# Patient Record
Sex: FEMALE | Race: WHITE | NOT HISPANIC OR LATINO | Employment: FULL TIME | URBAN - METROPOLITAN AREA
[De-identification: names, ages, dates, MRNs, and addresses within clinical notes are randomized per-mention and may not be internally consistent; named-entity substitution may affect disease eponyms.]

---

## 2017-01-17 ENCOUNTER — OFFICE VISIT (OUTPATIENT)
Dept: URGENT CARE | Facility: PHYSICIAN GROUP | Age: 52
End: 2017-01-17
Payer: COMMERCIAL

## 2017-01-17 VITALS
WEIGHT: 213 LBS | DIASTOLIC BLOOD PRESSURE: 60 MMHG | HEIGHT: 66 IN | BODY MASS INDEX: 34.23 KG/M2 | RESPIRATION RATE: 20 BRPM | SYSTOLIC BLOOD PRESSURE: 98 MMHG | OXYGEN SATURATION: 98 % | TEMPERATURE: 97.9 F | HEART RATE: 81 BPM

## 2017-01-17 DIAGNOSIS — J45.901 ASTHMA EXACERBATION: ICD-10-CM

## 2017-01-17 DIAGNOSIS — J01.40 ACUTE NON-RECURRENT PANSINUSITIS: ICD-10-CM

## 2017-01-17 DIAGNOSIS — H92.03 OTALGIA OF BOTH EARS: ICD-10-CM

## 2017-01-17 DIAGNOSIS — H69.93 ETD (EUSTACHIAN TUBE DYSFUNCTION), BILATERAL: ICD-10-CM

## 2017-01-17 PROCEDURE — 99214 OFFICE O/P EST MOD 30 MIN: CPT | Performed by: PHYSICIAN ASSISTANT

## 2017-01-17 RX ORDER — ALBUTEROL SULFATE 90 UG/1
2 AEROSOL, METERED RESPIRATORY (INHALATION) EVERY 6 HOURS PRN
Qty: 8.5 G | Refills: 0 | Status: SHIPPED | OUTPATIENT
Start: 2017-01-17 | End: 2023-07-16

## 2017-01-17 RX ORDER — AMOXICILLIN AND CLAVULANATE POTASSIUM 875; 125 MG/1; MG/1
1 TABLET, FILM COATED ORAL 2 TIMES DAILY
Qty: 20 TAB | Refills: 0 | Status: SHIPPED | OUTPATIENT
Start: 2017-01-17 | End: 2017-03-06 | Stop reason: SDUPTHER

## 2017-01-17 ASSESSMENT — ENCOUNTER SYMPTOMS
ABDOMINAL PAIN: 0
SHORTNESS OF BREATH: 0
MYALGIAS: 1
DIARRHEA: 0
HEADACHES: 1
DIZZINESS: 0
FEVER: 0
NAUSEA: 0
CHILLS: 0
SORE THROAT: 1
COUGH: 1

## 2017-01-17 NOTE — MR AVS SNAPSHOT
"        Keiko Burgos   2017 4:45 PM   Office Visit   MRN: [id number]    Department:  [facility]   Dept Phone:  [phone]    Description:  Female : 1965   Provider:  Roselia Wu PA-C           Reason for Visit     Otalgia Rt ear pain, productive cough, sinus pressure and pain, loss of voice x 6 days.       Allergies as of 2017     Allergen Noted Reactions    Influenza Vaccines 2016   Unspecified    Hx of Guillian Orlando    Keflex 2009   Unspecified    Hx of Guillian [place]    Other Drug 2016   Unspecified    Pneumonia vaccine. Hx of Guillian [place]    Rocephin [Ceftriaxone Sodium-Lidocaine] 2015   Unspecified    PT WILL NOT TAKE MEDICATION. NOT ALLERGIC.    Sulfa Drugs 2009   Unspecified    Hx of Guillian [place]    Tape 2016   Unspecified    Blister. Paper tape ok per patient    Tetanus Toxoids 2016   Unspecified    Hx of Guillian [place]      You were diagnosed with     Asthma exacerbation   [805959]       Acute non-recurrent pansinusitis   [1643266]       ETD (eustachian tube dysfunction), bilateral   [5396657]       Otalgia of both ears   [[id number]]         Vital Signs     Blood Pressure Pulse Temperature Respirations Height Weight    98/60 mmHg 81 36.6 °C (97.9 °F) 20 1.676 m (5' 6\") 96.616 kg (213 lb)    Body Mass Index Oxygen Saturation Smoking Status             34.40 kg/m2 98% Former Smoker         Basic Information     Date Of Birth Sex Race Ethnicity Preferred Language    1965 Female White Non- English      Problem List              ICD-10-CM Priority Class Noted - Resolved    Depression F32.9   2009 - Present    Chronic fatigue R53.82   2009 - Present    Family discord Z63.9   2014 - Present    Mood disorder (CMS-HCC) F39   2014 - Present    Insomnia G47.00   2014 - Present    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   2015 - Present    Mammographic microcalcification found on " diagnostic imaging of breast R92.0   4/9/2015 - Present    Depression with anxiety (Chronic) F41.8   2/16/2016 - Present    Lumbar radiculopathy M54.16   8/5/2016 - Present    Lumbar postlaminectomy syndrome M96.1   8/5/2016 - Present    Chronic low back pain (Chronic) M54.5, G89.29   8/5/2016 - Present    Osteoarthritis of spine with radiculopathy, lumbosacral region M47.27   8/5/2016 - Present    Orthostatic hypotension I95.1 High  11/8/2016 - Present      Health Maintenance        Date Due Completion Dates    MAMMOGRAM 12/17/2017 12/17/2016, 11/24/2015, 4/14/2015, 11/19/2014, 5/2/2014, 10/30/2013, 10/23/2013    IMM DTaP/Tdap/Td Vaccine (2 - Td) 4/9/2025 4/9/2015            Current Immunizations     Tdap Vaccine 4/9/2015      Below and/or attached are the medications your provider expects you to take. Review all of your home medications and newly ordered medications with your provider and/or pharmacist. Follow medication instructions as directed by your provider and/or pharmacist. Please keep your medication list with you and share with your provider. Update the information when medications are discontinued, doses are changed, or new medications (including over-the-counter products) are added; and carry medication information at all times in the event of emergency situations     Allergies:  INFLUENZA VACCINES - Unspecified     KEFLEX - Unspecified     OTHER DRUG - Unspecified     ROCEPHIN - Unspecified     SULFA DRUGS - Unspecified     TAPE - Unspecified     TETANUS TOXOIDS - Unspecified               Medications  Valid as of: January 17, 2017 -  5:20 PM    Generic Name Brand Name Tablet Size Instructions for use    Albuterol Sulfate (Aero Soln) albuterol 108 (90 BASE) MCG/ACT Inhale 2 Puffs by mouth every 6 hours as needed for Shortness of Breath.        Amoxicillin-Pot Clavulanate (Tab) AUGMENTIN 875-125 MG Take 1 Tab by mouth 2 times a day.        B Complex-C (Tab) B-complex with vitamin C  Take 1 Tab by mouth  every evening.        Citalopram Hydrobromide (Tab) CELEXA 20 MG Take 20 mg by mouth every evening.        Coenzyme Q10   Take 1 Cap by mouth every evening.        Dronabinol (Cap) MARINOL 5 MG Take 5 mg by mouth every bedtime.        Fludrocortisone Acetate (Tab) FLORINEF 0.1 MG Take 1 Tab by mouth every morning.        GuaiFENesin   Take  by mouth.        Hydrocortisone (Tab) CORTEF 10 MG Take 1 Tab by mouth 2 times a day.        Ibuprofen (Tab) MOTRIN 800 MG Take 800 mg by mouth every 8 hours as needed for Moderate Pain.        Nabumetone (Tab) RELAFEN 500 MG Take 500 mg by mouth 2 times a day as needed for Moderate Pain.        Phenylephrine-Acetaminophen   Take  by mouth.        .                 Medicines prescribed today were sent to:     [facility] [address] [place] [address], [place] [address] [facility] [place] [address] [address] [place] [address]    Phone: [phone] Fax: [phone]    Open 24 Hours?: No      Medication refill instructions:       If your prescription bottle indicates you have medication refills left, it is not necessary to call your provider’s office. Please contact your pharmacy and they will refill your medication.    If your prescription bottle indicates you do not have any refills left, you may request refills at any time through one of the following ways: The online [employer] system (except Urgent Care), by calling your provider’s office, or by asking your pharmacy to contact your provider’s office with a refill request. Medication refills are processed only during regular business hours and may not be available until the next business day. Your provider may request additional information or to have a follow-up visit with you prior to refilling your medication.   *Please Note: Medication refills are assigned a new Rx number when refilled electronically. Your pharmacy may indicate that no refills were authorized even though a new prescription for the same medication is  available at the pharmacy. Please request the medicine by name with the pharmacy before contacting your provider for a refill.        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

## 2017-01-18 NOTE — PROGRESS NOTES
"Subjective:      Keiko Burgos is a 51 y.o. female who presents with Otalgia    Current medications reviewed.  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  Sinus headaches and tight chest with sob, fevers in first days of illness only.      Otalgia   Associated symptoms include coughing, headaches and a sore throat. Pertinent negatives include no abdominal pain, diarrhea or rash.       Review of Systems   Constitutional: Positive for malaise/fatigue. Negative for fever and chills.   HENT: Positive for congestion, ear pain and sore throat.    Respiratory: Positive for cough. Negative for shortness of breath.    Cardiovascular: Negative for chest pain.   Gastrointestinal: Negative for nausea, abdominal pain and diarrhea.   Musculoskeletal: Positive for myalgias. Negative for joint pain.   Skin: Negative for rash.   Neurological: Positive for headaches. Negative for dizziness.   All other systems reviewed and are negative.         Objective:     BP 98/60 mmHg  Pulse 81  Temp(Src) 36.6 °C (97.9 °F)  Resp 20  Ht 1.676 m (5' 6\")  Wt 96.616 kg (213 lb)  BMI 34.40 kg/m2  SpO2 98%     Physical Exam   Constitutional: She appears well-developed and well-nourished. No distress.   HENT:   Right Ear: Tympanic membrane and ear canal normal.   Left Ear: Tympanic membrane and ear canal " normal.   Nose: Mucosal edema present. Right sinus exhibits maxillary sinus tenderness. Right sinus exhibits no frontal sinus tenderness. Left sinus exhibits maxillary sinus tenderness. Left sinus exhibits no frontal sinus tenderness.   Mouth/Throat: Posterior oropharyngeal edema (mod) and posterior oropharyngeal erythema (mod) present. No oropharyngeal exudate.   Cardiovascular: Normal rate and regular rhythm.    Pulmonary/Chest: Effort normal. No respiratory distress. She has decreased breath sounds (mod) in the right upper field, the right middle field, the right lower field, the left upper field, the left middle field and the left lower field. She has no wheezes. She has no rales.   Lymphadenopathy:     She has no cervical adenopathy.   Skin: Skin is warm and dry.               Assessment/Plan:     1. Asthma exacerbation  albuterol 108 (90 BASE) MCG/ACT Aero Soln inhalation aerosol   2. Acute non-recurrent pansinusitis  amoxicillin-clavulanate (AUGMENTIN) 875-125 MG Tab   3. ETD (eustachian tube dysfunction), bilateral     4. Otalgia of both ears       Take all abx, push fluids rest.  Use inhaler regularly, OTC meds discussed to control symptoms.  Follow up with pcp in 2 weeks with further concerns. Patient reports understanding and agrees with plan.

## 2017-03-06 ENCOUNTER — OFFICE VISIT (OUTPATIENT)
Dept: MEDICAL GROUP | Facility: CLINIC | Age: 52
End: 2017-03-06
Payer: COMMERCIAL

## 2017-03-06 VITALS
HEIGHT: 66 IN | OXYGEN SATURATION: 97 % | WEIGHT: 217 LBS | HEART RATE: 80 BPM | TEMPERATURE: 98.5 F | DIASTOLIC BLOOD PRESSURE: 56 MMHG | SYSTOLIC BLOOD PRESSURE: 98 MMHG | RESPIRATION RATE: 16 BRPM | BODY MASS INDEX: 34.87 KG/M2

## 2017-03-06 DIAGNOSIS — H92.01 OTALGIA, RIGHT EAR: ICD-10-CM

## 2017-03-06 DIAGNOSIS — H66.91 OTITIS, RIGHT: ICD-10-CM

## 2017-03-06 PROCEDURE — 99213 OFFICE O/P EST LOW 20 MIN: CPT | Performed by: NURSE PRACTITIONER

## 2017-03-06 RX ORDER — AMOXICILLIN AND CLAVULANATE POTASSIUM 875; 125 MG/1; MG/1
1 TABLET, FILM COATED ORAL 2 TIMES DAILY
Qty: 20 TAB | Refills: 0 | Status: SHIPPED | OUTPATIENT
Start: 2017-03-06 | End: 2017-07-07 | Stop reason: SDUPTHER

## 2017-03-06 ASSESSMENT — ENCOUNTER SYMPTOMS
VOMITING: 0
DOUBLE VISION: 0
COUGH: 1
DIZZINESS: 0
FEVER: 0
SHORTNESS OF BREATH: 0
SORE THROAT: 1
CHILLS: 0
PHOTOPHOBIA: 0
HEADACHES: 1
NAUSEA: 1
SPUTUM PRODUCTION: 0
BLURRED VISION: 0
WHEEZING: 0
MYALGIAS: 1
EYE PAIN: 0

## 2017-03-06 NOTE — MR AVS SNAPSHOT
"        Keiko Rodrigues Aubrey   3/6/2017 4:00 PM   Office Visit   MRN: [id number]    Department:  [facility]   Dept Phone:  [phone]    Description:  Female : 1965   Provider:  AKIRA Schreiber           Reason for Visit     Otalgia Ears plugging/ sore thorat/ horseness/       Allergies as of 3/6/2017     Allergen Noted Reactions    Influenza Vaccines 2016   Unspecified    Hx of Guillian [place]    Keflex 2009   Unspecified    Hx of Guillian [place]    Other Drug 2016   Unspecified    Pneumonia vaccine. Hx of Guillian [place]    Rocephin [Ceftriaxone Sodium-Lidocaine] 2015   Unspecified    PT WILL NOT TAKE MEDICATION. NOT ALLERGIC.    Sulfa Drugs 2009   Unspecified    Hx of Guillian [place]    Tape 2016   Unspecified    Blister. Paper tape ok per patient    Tetanus Toxoids 2016   Unspecified    Hx of Guillian [place]      You were diagnosed with     Otitis, right   [5349292]       Otalgia, right ear   [[id number]]         Vital Signs     Blood Pressure Pulse Temperature Respirations Height Weight    98/56 mmHg 80 36.9 °C (98.5 °F) 16 1.676 m (5' 6\") 98.431 kg (217 lb)    Body Mass Index Oxygen Saturation Smoking Status             35.04 kg/m2 97% Former Smoker         Basic Information     Date Of Birth Sex Race Ethnicity Preferred Language    1965 Female White Non- English      Your appointments     2017  4:00 PM   Annual Exam with Marcie Antonio M.D.   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   2015 - " Present    Mammographic microcalcification found on diagnostic imaging of breast R92.0   4/9/2015 - Present    Depression with anxiety (Chronic) F41.8   2/16/2016 - Present    Lumbar radiculopathy M54.16   8/5/2016 - Present    Lumbar postlaminectomy syndrome M96.1   8/5/2016 - Present    Chronic low back pain (Chronic) M54.5, G89.29   8/5/2016 - Present    Osteoarthritis of spine with radiculopathy, lumbosacral region M47.27   8/5/2016 - Present    Orthostatic hypotension I95.1 High  11/8/2016 - Present      Health Maintenance        Date Due Completion Dates    MAMMOGRAM 12/17/2017 12/17/2016, 11/24/2015, 4/14/2015, 11/19/2014, 5/2/2014, 10/30/2013, 10/23/2013    IMM DTaP/Tdap/Td Vaccine (2 - Td) 4/9/2025 4/9/2015            Current Immunizations     Tdap Vaccine 4/9/2015      Below and/or attached are the medications your provider expects you to take. Review all of your home medications and newly ordered medications with your provider and/or pharmacist. Follow medication instructions as directed by your provider and/or pharmacist. Please keep your medication list with you and share with your provider. Update the information when medications are discontinued, doses are changed, or new medications (including over-the-counter products) are added; and carry medication information at all times in the event of emergency situations     Allergies:  INFLUENZA VACCINES - Unspecified     KEFLEX - Unspecified     OTHER DRUG - Unspecified     ROCEPHIN - Unspecified     SULFA DRUGS - Unspecified     TAPE - Unspecified     TETANUS TOXOIDS - Unspecified               Medications  Valid as of: March 06, 2017 -  5:17 PM    Generic Name Brand Name Tablet Size Instructions for use    Albuterol Sulfate (Aero Soln) albuterol 108 (90 BASE) MCG/ACT Inhale 2 Puffs by mouth every 6 hours as needed for Shortness of Breath.        Amoxicillin-Pot Clavulanate (Tab) AUGMENTIN 875-125 MG Take 1 Tab by mouth 2 times a day.        B Complex-C  (Tab) B-complex with vitamin C  Take 1 Tab by mouth every evening.        Citalopram Hydrobromide (Tab) CELEXA 20 MG Take 20 mg by mouth every evening.        Coenzyme Q10   Take 1 Cap by mouth every evening.        Dronabinol (Cap) MARINOL 5 MG Take 5 mg by mouth every bedtime.        Fludrocortisone Acetate (Tab) FLORINEF 0.1 MG Take 1 Tab by mouth every morning.        GuaiFENesin   Take  by mouth.        Hydrocortisone (Tab) CORTEF 10 MG Take 1 Tab by mouth 2 times a day.        Ibuprofen (Tab) MOTRIN 800 MG Take 800 mg by mouth every 8 hours as needed for Moderate Pain.        Nabumetone (Tab) RELAFEN 500 MG Take 500 mg by mouth 2 times a day as needed for Moderate Pain.        Phenylephrine-Acetaminophen   Take  by mouth.        .                 Medicines prescribed today were sent to:     [facility] [address] [place] [address], [place] [address] [facility] [facility] [employer]    [address] [place] [address]    Phone: [phone] Fax: [phone]    Open 24 Hours?: No      Medication refill instructions:       If your prescription bottle indicates you have medication refills left, it is not necessary to call your provider’s office. Please contact your pharmacy and they will refill your medication.    If your prescription bottle indicates you do not have any refills left, you may request refills at any time through one of the following ways: The online [employer] system (except Urgent Care), by calling your provider’s office, or by asking your pharmacy to contact your provider’s office with a refill request. Medication refills are processed only during regular business hours and may not be available until the next business day. Your provider may request additional information or to have a follow-up visit with you prior to refilling your medication.   *Please Note: Medication refills are assigned a new Rx number when refilled electronically. Your pharmacy may indicate that no refills were authorized even  though a new prescription for the same medication is available at the pharmacy. Please request the medicine by name with the pharmacy before contacting your provider for a refill.        Referral     A referral request has been sent to our patient care coordination department. Please allow 3-5 business days for us to process this request and contact you either by phone or mail. If you do not hear from us by the 5th business day, please call us at [phone].        Other Notes About Your Plan     History Guillain-[place]inck[place] syndrome - no vaccines           MyChart Access Code: Activation code not generated  Current MyChart Status: Active

## 2017-03-07 NOTE — PROGRESS NOTES
Chief Complaint   Patient presents with   • Otalgia     Ears plugging/ sore thorat/ horseness/        HISTORY OF PRESENT ILLNESS: Patient is a 51 y.o. female established patient who presents today due to a week hx of ear plugging, sore throat, hoarseness, coughing. Pt reported that he gets recurrent symptoms about 6 times in the past 12 months. She is not taking any OTC medication for her symptoms. She reported that her otalgia is usually at right side more than left side and in the past year, she does feel a little bit hearing impairment to her right ear. She reported that she does not have any tinitus and she can hear and understand what people taking about but it just has that weird feeling the voice is filmed out or the conduction is just different. She denied fever or chills. No dizziness or vertigo. She took augmentin before for laryngitis, pharyngitis, otitis or sinusitis every time when she was seen for these symptoms and so far the augmentin seems to work very well but she just keep having it back every 2-3 months.       Patient Active Problem List    Diagnosis Date Noted   • Orthostatic hypotension 11/08/2016     Priority: High   • Lumbar radiculopathy 08/05/2016   • Lumbar postlaminectomy syndrome 08/05/2016   • Chronic low back pain 08/05/2016   • Osteoarthritis of spine with radiculopathy, lumbosacral region 08/05/2016   • Depression with anxiety 02/16/2016   • History of Guillain-[place] syndrome 04/09/2015   • Mammographic microcalcification found on diagnostic imaging of breast 04/09/2015   • Family discord 04/17/2014   • Mood disorder (CMS-[facility]) 04/17/2014   • Insomnia 04/17/2014   • Depression 09/23/2009   • Chronic fatigue 09/23/2009       Allergies:Influenza vaccines; Keflex; Other drug; Rocephin; Sulfa drugs; Tape; and Tetanus toxoids    Current Outpatient Prescriptions   Medication Sig Dispense Refill   • GuaiFENesin (MUCINEX PO) Take  by mouth.     • Phenylephrine-Acetaminophen (SUDAFED PE  PRESSURE + PAIN PO) Take  by mouth.     • albuterol 108 (90 BASE) MCG/ACT Aero Soln inhalation aerosol Inhale 2 Puffs by mouth every 6 hours as needed for Shortness of Breath. 8.5 g 0   • hydrocortisone (CORTEF) 10 MG Tab Take 1 Tab by mouth 2 times a day. 60 Tab 5   • fludrocortisone (FLORINEF) 0.1 MG Tab Take 1 Tab by mouth every morning. 30 Tab 0   • B Complex-C (B-COMPLEX WITH VITAMIN C) tablet Take 1 Tab by mouth every evening.     • Coenzyme Q10 (CO Q 10 PO) Take 1 Cap by mouth every evening.     • citalopram (CELEXA) 20 MG Tab Take 20 mg by mouth every evening.     • ibuprofen (MOTRIN) 800 MG Tab Take 800 mg by mouth every 8 hours as needed for Moderate Pain.     • nabumetone (RELAFEN) 500 MG Tab Take 500 mg by mouth 2 times a day as needed for Moderate Pain.     • dronabinol (MARINOL) 5 MG Cap Take 5 mg by mouth every bedtime.     • amoxicillin-clavulanate (AUGMENTIN) 875-125 MG Tab Take 1 Tab by mouth 2 times a day. 20 Tab 0     No current facility-administered medications for this visit.       Social History   Substance Use Topics   • Smoking status: Former Smoker     Quit date: 11/19/1989   • Smokeless tobacco: Never Used      Comment: avoid all tobacco products   • Alcohol Use: 0.0 oz/week     0 Standard drinks or equivalent per week      Comment: rarely       Family Status   Relation Status Death Age   • Mother Alive    • Father Alive      dementia   • Maternal Grandmother Alive    • Paternal Grandmother Alive      Family History   Problem Relation Age of Onset   • Heart Disease Mother    • Heart Disease Father    • Cancer Maternal Grandmother      colon    • Cancer Paternal Grandmother      ovarian         ROS:  Review of Systems   Constitutional: Negative for fever and chills.   HENT: Positive for ear pain and sore throat. Negative for congestion, hearing loss and tinnitus ( no hearing loss but feels a little bit imparied to right side).    Eyes: Negative for blurred vision, double vision, photophobia  "and pain.   Respiratory: Positive for cough. Negative for sputum production, shortness of breath and wheezing.    Gastrointestinal: Positive for nausea. Negative for vomiting.   Musculoskeletal: Positive for myalgias.   Neurological: Positive for headaches. Negative for dizziness.        Objective:     Blood pressure 98/56, pulse 80, temperature 36.9 °C (98.5 °F), resp. rate 16, height 1.676 m (5' 6\"), weight 98.431 kg (217 lb), SpO2 97 %.     Physical Exam:  Physical Exam   Constitutional: She is oriented to person, place, and time and well-developed, well-nourished, and in no distress.   HENT:   Head: Normocephalic and atraumatic.   Right Ear: External ear normal. Tympanic membrane is injected. A middle ear effusion is present.   Left Ear: Hearing, tympanic membrane and external ear normal.   Nose: Right sinus exhibits no maxillary sinus tenderness and no frontal sinus tenderness. Left sinus exhibits no maxillary sinus tenderness and no frontal sinus tenderness.   Mouth/Throat: No oropharyngeal exudate, posterior oropharyngeal edema, posterior oropharyngeal erythema or tonsillar abscesses.   Eyes: Conjunctivae are normal.   Neck: Neck supple. No JVD present.   Cardiovascular: Normal rate.    Pulmonary/Chest: Effort normal. No respiratory distress. She has no wheezes. She has no rales.   Musculoskeletal: Normal range of motion.   Neurological: She is alert and oriented to person, place, and time.   Skin: Skin is warm. No erythema.   Vitals reviewed.        Assessment/Plan:  1. Otitis, right  - amoxicillin-clavulanate (AUGMENTIN) 875-125 MG Tab; Take 1 Tab by mouth 2 times a day.  Dispense: 20 Tab; Refill: 0  - REFERRAL TO ENT    2. Otalgia, right ear, recurrent 6 times in 12 months   - REFERRAL TO ENT         "

## 2017-04-20 ENCOUNTER — HOSPITAL ENCOUNTER (OUTPATIENT)
Dept: LAB | Facility: MEDICAL CENTER | Age: 52
End: 2017-04-20
Attending: INTERNAL MEDICINE
Payer: COMMERCIAL

## 2017-04-20 LAB
ESTRADIOL SERPL-MCNC: 123 PG/ML
FSH SERPL-ACNC: 6.6 MIU/ML
LH SERPL-ACNC: 8 IU/L
PROLACTIN SERPL-MCNC: 13.73 NG/ML (ref 2.8–26)
T3FREE SERPL-MCNC: 2.65 PG/ML (ref 2.4–4.2)
T4 FREE SERPL-MCNC: 0.99 NG/DL (ref 0.53–1.43)
TSH SERPL DL<=0.005 MIU/L-ACNC: 0.91 UIU/ML (ref 0.3–3.7)

## 2017-04-20 PROCEDURE — 84146 ASSAY OF PROLACTIN: CPT

## 2017-04-20 PROCEDURE — 84443 ASSAY THYROID STIM HORMONE: CPT

## 2017-04-20 PROCEDURE — 84305 ASSAY OF SOMATOMEDIN: CPT

## 2017-04-20 PROCEDURE — 83002 ASSAY OF GONADOTROPIN (LH): CPT

## 2017-04-20 PROCEDURE — 84481 FREE ASSAY (FT-3): CPT

## 2017-04-20 PROCEDURE — 36415 COLL VENOUS BLD VENIPUNCTURE: CPT

## 2017-04-20 PROCEDURE — 84439 ASSAY OF FREE THYROXINE: CPT

## 2017-04-20 PROCEDURE — 83001 ASSAY OF GONADOTROPIN (FSH): CPT

## 2017-04-20 PROCEDURE — 82670 ASSAY OF TOTAL ESTRADIOL: CPT

## 2017-04-22 LAB — IGF-I SERPL-MCNC: 131 NG/ML (ref 118–298)

## 2017-04-26 ENCOUNTER — HOSPITAL ENCOUNTER (OUTPATIENT)
Facility: MEDICAL CENTER | Age: 52
End: 2017-04-26
Attending: OBSTETRICS & GYNECOLOGY
Payer: COMMERCIAL

## 2017-04-26 ENCOUNTER — GYNECOLOGY VISIT (OUTPATIENT)
Dept: OBGYN | Facility: MEDICAL CENTER | Age: 52
End: 2017-04-26
Payer: COMMERCIAL

## 2017-04-26 VITALS
BODY MASS INDEX: 34.72 KG/M2 | HEIGHT: 66 IN | WEIGHT: 216 LBS | SYSTOLIC BLOOD PRESSURE: 124 MMHG | DIASTOLIC BLOOD PRESSURE: 80 MMHG

## 2017-04-26 DIAGNOSIS — Z12.4 ROUTINE CERVICAL SMEAR: ICD-10-CM

## 2017-04-26 DIAGNOSIS — Z01.419 WELL WOMAN EXAM: ICD-10-CM

## 2017-04-26 DIAGNOSIS — Z11.51 SPECIAL SCREENING EXAMINATION FOR HUMAN PAPILLOMAVIRUS (HPV): ICD-10-CM

## 2017-04-26 PROCEDURE — 87624 HPV HI-RISK TYP POOLED RSLT: CPT

## 2017-04-26 PROCEDURE — 99396 PREV VISIT EST AGE 40-64: CPT | Performed by: OBSTETRICS & GYNECOLOGY

## 2017-04-26 PROCEDURE — 88175 CYTOPATH C/V AUTO FLUID REDO: CPT

## 2017-04-26 NOTE — MR AVS SNAPSHOT
"        Keiko Burgos   2017 4:00 PM   Gynecology Visit   MRN: [id number]    Department:  [facility]   Dept Phone:  [phone]    Description:  Female : 1965   Provider:  Marcie Antonio M.D.           Reason for Visit     Annual Exam Annual exam       Allergies as of 2017     Allergen Noted Reactions    Influenza Vaccines 2016   Unspecified    Hx of Guillian Romulus    Keflex 2009   Unspecified    Hx of Guillian [place]    Other Drug 2016   Unspecified    Pneumonia vaccine. Hx of Guillian [place]    Rocephin [Ceftriaxone Sodium-Lidocaine] 2015   Unspecified    PT WILL NOT TAKE MEDICATION. NOT ALLERGIC.    Sulfa Drugs 2009   Unspecified    Hx of Guillian [place]    Tape 2016   Unspecified    Blister. Paper tape ok per patient    Tetanus Toxoids 2016   Unspecified    Hx of Guillian [place]      You were diagnosed with     Well woman exam   [136127]       Routine cervical smear   [980226]       Special screening examination for human papillomavirus (HPV)   [V73.81.ICD-9-CM]         Vital Signs     Blood Pressure Height Weight Body Mass Index Smoking Status       124/80 mmHg 1.676 m (5' 5.98\") 97.977 kg (216 lb) 34.88 kg/m2 Former Smoker       Basic Information     Date Of Birth Sex Race Ethnicity Preferred Language    1965 Female White Non- English      Problem List              ICD-10-CM Priority Class Noted - Resolved    Depression F32.9   2009 - Present    Chronic fatigue R53.82   2009 - Present    Family discord Z63.9   2014 - Present    Mood disorder (CMS-HCC) F39   2014 - Present    Insomnia G47.00   2014 - Present    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   2015 - Present    Mammographic microcalcification found on diagnostic imaging of breast R92.0   2015 - Present    Depression with anxiety (Chronic) F41.8   2016 - Present    Lumbar radiculopathy M54.16   2016 - Present    Lumbar " postlaminectomy syndrome M96.1   8/5/2016 - Present    Chronic low back pain (Chronic) M54.5, G89.29   8/5/2016 - Present    Osteoarthritis of spine with radiculopathy, lumbosacral region M47.27   8/5/2016 - Present    Orthostatic hypotension I95.1 High  11/8/2016 - Present      Health Maintenance        Date Due Completion Dates    MAMMOGRAM 12/17/2017 12/17/2016, 11/24/2015, 4/14/2015, 11/19/2014, 5/2/2014, 10/30/2013, 10/23/2013    IMM DTaP/Tdap/Td Vaccine (2 - Td) 4/9/2025 4/9/2015            Current Immunizations     Tdap Vaccine 4/9/2015      Below and/or attached are the medications your provider expects you to take. Review all of your home medications and newly ordered medications with your provider and/or pharmacist. Follow medication instructions as directed by your provider and/or pharmacist. Please keep your medication list with you and share with your provider. Update the information when medications are discontinued, doses are changed, or new medications (including over-the-counter products) are added; and carry medication information at all times in the event of emergency situations     Allergies:  INFLUENZA VACCINES - Unspecified     KEFLEX - Unspecified     OTHER DRUG - Unspecified     ROCEPHIN - Unspecified     SULFA DRUGS - Unspecified     TAPE - Unspecified     TETANUS TOXOIDS - Unspecified               Medications  Valid as of: April 26, 2017 -  4:56 PM    Generic Name Brand Name Tablet Size Instructions for use    Albuterol Sulfate (Aero Soln) albuterol 108 (90 BASE) MCG/ACT Inhale 2 Puffs by mouth every 6 hours as needed for Shortness of Breath.        Amoxicillin-Pot Clavulanate (Tab) AUGMENTIN 875-125 MG Take 1 Tab by mouth 2 times a day.        B Complex-C (Tab) B-complex with vitamin C  Take 1 Tab by mouth every evening.        Citalopram Hydrobromide (Tab) CELEXA 20 MG Take 20 mg by mouth every evening.        Coenzyme Q10   Take 1 Cap by mouth every evening.        Dronabinol (Cap)  MARINOL 5 MG Take 5 mg by mouth every bedtime.        Fludrocortisone Acetate (Tab) FLORINEF 0.1 MG Take 1 Tab by mouth every morning.        GuaiFENesin   Take  by mouth.        Hydrocortisone (Tab) CORTEF 10 MG Take 1 Tab by mouth 2 times a day.        Ibuprofen (Tab) MOTRIN 800 MG Take 800 mg by mouth every 8 hours as needed for Moderate Pain.        Nabumetone (Tab) RELAFEN 500 MG Take 500 mg by mouth 2 times a day as needed for Moderate Pain.        Phenylephrine-Acetaminophen   Take  by mouth.        .                 Medicines prescribed today were sent to:     [facility] [address] [place], [place] [address], [place] - [address] [facility] [address]    [address] [place] [address]    Phone: [phone] Fax: [phone]    Open 24 Hours?: No      Medication refill instructions:       If your prescription bottle indicates you have medication refills left, it is not necessary to call your provider’s office. Please contact your pharmacy and they will refill your medication.    If your prescription bottle indicates you do not have any refills left, you may request refills at any time through one of the following ways: The online [employer] system (except Urgent Care), by calling your provider’s office, or by asking your pharmacy to contact your provider’s office with a refill request. Medication refills are processed only during regular business hours and may not be available until the next business day. Your provider may request additional information or to have a follow-up visit with you prior to refilling your medication.   *Please Note: Medication refills are assigned a new Rx number when refilled electronically. Your pharmacy may indicate that no refills were authorized even though a new prescription for the same medication is available at the pharmacy. Please request the medicine by name with the pharmacy before contacting your provider for a refill.        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

## 2017-04-26 NOTE — PROGRESS NOTES
Keiko Burgos is a 51 y.o. y.o. female who presents for her Gynecologic Exam        HPI Comments: Pt presents for well woman exam. Pt has no complaints. No LMP recorded. Patient has had a hysterectomy.  .  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   • Anemia    • H/O bladder infections    • H/O infectious mononucleosis    • H/O meningitis    • H/O: pneumonia      Past Surgical History   Procedure Laterality Date   • Lumbar decompression     • Abdominal hysterectomy total       fibroid   • Oophorectomy       rt side only removed     Influenza vaccines; Keflex; Other drug; Rocephin; Sulfa drugs; Tape; and Tetanus toxoids  Social History     Social History   • Marital Status: Single     Spouse Name: N/A   • Number of Children: N/A   • Years of Education: N/A     Social History Main Topics   • Smoking status: Former Smoker     Quit date: 11/19/1989   • Smokeless tobacco: Never Used      Comment: avoid all tobacco products   • Alcohol Use: 0.0 oz/week     0 Standard drinks or equivalent per week      Comment: rarely   • Drug Use: No   • Sexual Activity:     Partners: Male      Comment: work: computer      Other Topics Concern   • None     Social History Narrative     Family History   Problem Relation Age of Onset   • Heart Disease Mother    • Heart Disease Father    • Cancer Maternal Grandmother      colon    • Cancer Paternal Grandmother      ovarian     Medications:   Current Outpatient Prescriptions Ordered in [facility]   Medication Sig Dispense Refill   • fludrocortisone (FLORINEF) 0.1 MG Tab Take 1 Tab by mouth every morning. 30 Tab 0   • B Complex-C (B-COMPLEX WITH VITAMIN C) tablet Take 1 Tab by mouth  "every evening.     • Coenzyme Q10 (CO Q 10 PO) Take 1 Cap by mouth every evening.     • citalopram (CELEXA) 20 MG Tab Take 20 mg by mouth every evening.     • dronabinol (MARINOL) 5 MG Cap Take 5 mg by mouth every bedtime.     • amoxicillin-clavulanate (AUGMENTIN) 875-125 MG Tab Take 1 Tab by mouth 2 times a day. 20 Tab 0   • GuaiFENesin (MUCINEX PO) Take  by mouth.     • Phenylephrine-Acetaminophen (SUDAFED PE PRESSURE + PAIN PO) Take  by mouth.     • albuterol 108 (90 BASE) MCG/ACT Aero Soln inhalation aerosol Inhale 2 Puffs by mouth every 6 hours as needed for Shortness of Breath. 8.5 g 0   • hydrocortisone (CORTEF) 10 MG Tab Take 1 Tab by mouth 2 times a day. 60 Tab 5   • ibuprofen (MOTRIN) 800 MG Tab Take 800 mg by mouth every 8 hours as needed for Moderate Pain.     • nabumetone (RELAFEN) 500 MG Tab Take 500 mg by mouth 2 times a day as needed for Moderate Pain.       No current Epic-ordered facility-administered medications on file.          Objective:   Vital measurements:  Blood pressure 124/80, height 1.676 m (5' 5.98\"), weight 97.977 kg (216 lb).  Body mass index is 34.88 kg/(m^2). (Goal BM I>18 <25)    Physical Exam   Nursing note and vitals reviewed.  Constitutional: She is oriented to person, place, and time. She appears well-developed and well-nourished. No distress.     HEENT:   Head: Normocephalic and atraumatic.   Right Ear: External ear normal.   Left Ear: External ear normal.   Nose: Nose normal.   Eyes: Conjunctivae and EOM are normal. Pupils are equal, round, and reactive to light. No scleral icterus.     Neck: Normal range of motion. Neck supple. No tracheal deviation present. No thyromegaly present.     Pulmonary/Chest: Effort normal and breath sounds normal. No respiratory distress. She has no wheezes. She has no rales. She exhibits no tenderness.     Cardiovascular: Regular, rate and rhythm. No JVD.    Abdominal: Soft. Bowel sounds are normal. She exhibits no distension and no mass. No " tenderness. She has no rebound and no guarding.     Breast:  Symmetrical, normal consistency without masses., No dimpling or skin changes, Normal nipples without discharge, no axillary lymphadenopathy, negative    Genitourinary:  Pelvic exam was performed with patient supine.  External genitalia with no abnormal pigmentation, labial fusion,rash, tenderness, lesion or injury to the labia bilaterally.  Vagina is moist with no lesions, foul discharge, erythema, tenderness or bleeding. No foreign body around the vagina or signs of injury.   Cervix exhibits no motion tenderness, no discharge and no friability.   Uterus is removed but cervix has remained.  Right adnexum displays no mass, no tenderness and no fullness. Left adnexum displays no mass, no tenderness and no fullness.     Musculoskeletal: Normal range of motion. She exhibits no edema and no tenderness.     Lymphadenopathy: She has no cervical adenopathy.     Neurological: She is alert and oriented to person, place, and time. She exhibits normal muscle tone.     Skin: Skin is warm and dry. No rash noted. She is not diaphoretic. No erythema. No pallor.     Psychiatric: She has a normal mood and affect. Her behavior is normal. Judgment and thought content normal.               Assessment:     1. Well woman exam  THINPREP PAP WITH HPV   2. Routine cervical smear  THINPREP PAP WITH HPV   3. Special screening examination for human papillomavirus (HPV)  THINPREP PAP WITH HPV         Plan:   Pap and physical exam performed  Monthly SBE.  Counseling: breast self exam, mammography screening, STD prevention, HIV risk factors and prevention, menopause, osteoporosis and adequate intake of calcium and vitamin D  Encourage exercise and proper diet.  Mammograms starting @ age 40 annually.  See medications and orders placed in encounter report.

## 2017-04-27 LAB
CYTOLOGY REG CYTOL: NORMAL
HPV HR 12 DNA CVX QL NAA+PROBE: NEGATIVE
HPV16 DNA SPEC QL NAA+PROBE: NEGATIVE
HPV18 DNA SPEC QL NAA+PROBE: NEGATIVE
SPECIMEN SOURCE: NORMAL

## 2017-07-07 ENCOUNTER — OFFICE VISIT (OUTPATIENT)
Dept: URGENT CARE | Facility: CLINIC | Age: 52
End: 2017-07-07
Payer: COMMERCIAL

## 2017-07-07 VITALS
HEIGHT: 66 IN | BODY MASS INDEX: 33.75 KG/M2 | TEMPERATURE: 97.7 F | HEART RATE: 71 BPM | RESPIRATION RATE: 14 BRPM | OXYGEN SATURATION: 97 % | SYSTOLIC BLOOD PRESSURE: 114 MMHG | DIASTOLIC BLOOD PRESSURE: 78 MMHG | WEIGHT: 210 LBS

## 2017-07-07 DIAGNOSIS — J02.9 PHARYNGITIS, UNSPECIFIED ETIOLOGY: ICD-10-CM

## 2017-07-07 DIAGNOSIS — J03.90 TONSILLITIS: ICD-10-CM

## 2017-07-07 DIAGNOSIS — H69.91 EUSTACHIAN TUBE DYSFUNCTION, RIGHT: ICD-10-CM

## 2017-07-07 LAB
INT CON NEG: NEGATIVE
INT CON POS: POSITIVE
S PYO AG THROAT QL: NEGATIVE

## 2017-07-07 PROCEDURE — 87880 STREP A ASSAY W/OPTIC: CPT | Performed by: FAMILY MEDICINE

## 2017-07-07 PROCEDURE — 99214 OFFICE O/P EST MOD 30 MIN: CPT | Performed by: FAMILY MEDICINE

## 2017-07-07 RX ORDER — AMOXICILLIN AND CLAVULANATE POTASSIUM 875; 125 MG/1; MG/1
1 TABLET, FILM COATED ORAL 2 TIMES DAILY
Qty: 20 TAB | Refills: 0 | Status: SHIPPED | OUTPATIENT
Start: 2017-07-07 | End: 2017-07-17

## 2017-07-08 NOTE — PROGRESS NOTES
HPI: Keiko Burgos is a 52 y.o. female who presents with  acute onset of right ear pain and sore throat today. She had some fatigue malaise some chills. She has a history of recurrent right ear infections no history of tobacco and ostomy tubes. Denies any significant cough or chest congestion patient does have a history of asthma did feel slight shortness of breath on a hike recently but it only lasted several minutes she did not require use of any rescue inhaler.    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  MEDS:   Current outpatient prescriptions:   •  amoxicillin-clavulanate (AUGMENTIN) 875-125 MG Tab, Take 1 Tab by mouth 2 times a day., Disp: 20 Tab, Rfl: 0  •  GuaiFENesin (MUCINEX PO), Take  by mouth., Disp: , Rfl:   •  Phenylephrine-Acetaminophen (SUDAFED PE PRESSURE + PAIN PO), Take  by mouth., Disp: , Rfl:   •  albuterol 108 (90 BASE) MCG/ACT Aero Soln inhalation aerosol, Inhale 2 Puffs by mouth every 6 hours as needed for Shortness of Breath., Disp: 8.5 g, Rfl: 0  •  hydrocortisone (CORTEF) 10 MG Tab, Take 1 Tab by mouth 2 times a day., Disp: 60 Tab, Rfl: 5  •  fludrocortisone (FLORINEF) 0.1 MG Tab, Take 1 Tab by mouth every morning., Disp: 30 Tab, Rfl: 0  •  B Complex-C (B-COMPLEX WITH VITAMIN C) tablet, Take 1 Tab by mouth every evening., Disp: , Rfl:   •  Coenzyme Q10 (CO Q 10 PO), Take 1 Cap by mouth every evening., Disp: , Rfl:   •  citalopram (CELEXA) 20 MG Tab, Take 20 mg by mouth every evening., Disp: , Rfl:   •  ibuprofen (MOTRIN) 800 MG Tab, Take 800 mg by mouth every 8 hours as needed for Moderate Pain., Disp: , Rfl:   •   "nabumetone (RELAFEN) 500 MG Tab, Take 500 mg by mouth 2 times a day as needed for Moderate Pain., Disp: , Rfl:   •  dronabinol (MARINOL) 5 MG Cap, Take 5 mg by mouth every bedtime., Disp: , Rfl:   ALLERGIES:   Allergies   Allergen Reactions   • Influenza Vaccines Unspecified     Hx of Guillian [place]   • Keflex Unspecified     Hx of Guillian [place]   • Other Drug Unspecified     Pneumonia vaccine. Hx of Guillian [place]   • Rocephin [Ceftriaxone Sodium-Lidocaine] Unspecified     PT WILL NOT TAKE MEDICATION. NOT ALLERGIC.   • Sulfa Drugs Unspecified     Hx of Guillian [place]   • Tape Unspecified     Blister. Paper tape ok per patient   • Tetanus Toxoids Unspecified     Hx of Guillian [place]   SURGHX:   Past Surgical History   Procedure Laterality Date   • Lumbar decompression     • Abdominal hysterectomy total       fibroid   • Oophorectomy       rt side only removed   SOCHX:  reports that she quit smoking about 27 years ago. She has never used smokeless tobacco. She reports that she drinks alcohol. She reports that she does not use illicit drugs.  FH: Family history was reviewed, no pertinent findings to report    PE:  Vitals Blood pressure 114/78, pulse 71, temperature 36.5 °C (97.7 °F), resp. rate 14, height 1.676 m (5' 5.98\"), weight 95.255 kg (210 lb), SpO2 97 %.  Gen AOx4, NAD  HEENT: moist mucus membranes, no pain or pressure with percussion of frontal, maxillary or ethmoid sinuses.  Bilateral conjunciva clear without erythema or exudate,  Right tm with erythema and fluid, left clear without bulge, fluid or loss of landmarks, mod pharyngeal erythema with right tonsillar exudate and bilateral tonsillar enlargement present  Neck: supple, no cervical lymphadenopathy, no signs of menigismus  CV/PULM: RRR no murmurs, no rales ronchi or wheezes, no signs of resp distress  Abd soft nontender, bs present  Skin no rashes  Extremities -c/c/e  Neuro appropriate affect,     A/P  1. Eustachian tube dysfunction, right  POCT Rapid " Strep A    amoxicillin-clavulanate (AUGMENTIN) 875-125 MG Tab   2. Pharyngitis, unspecified etiology  POCT Rapid Strep A    amoxicillin-clavulanate (AUGMENTIN) 875-125 MG Tab   3. Tonsillitis  POCT Rapid Strep A    amoxicillin-clavulanate (AUGMENTIN) 875-125 MG Tab     Follow-up with primary care provider within 4-5 days, emergency room precautions discussed.  Patient and/or family appears understanding of information.

## 2017-07-12 ENCOUNTER — GYNECOLOGY VISIT (OUTPATIENT)
Dept: OBGYN | Facility: MEDICAL CENTER | Age: 52
End: 2017-07-12
Payer: COMMERCIAL

## 2017-07-12 ENCOUNTER — HOSPITAL ENCOUNTER (OUTPATIENT)
Facility: MEDICAL CENTER | Age: 52
End: 2017-07-12
Attending: OBSTETRICS & GYNECOLOGY
Payer: COMMERCIAL

## 2017-07-12 VITALS
WEIGHT: 207 LBS | SYSTOLIC BLOOD PRESSURE: 102 MMHG | DIASTOLIC BLOOD PRESSURE: 64 MMHG | BODY MASS INDEX: 33.27 KG/M2 | HEIGHT: 66 IN

## 2017-07-12 DIAGNOSIS — Z11.3 SCREENING FOR STDS (SEXUALLY TRANSMITTED DISEASES): ICD-10-CM

## 2017-07-12 PROCEDURE — 87491 CHLMYD TRACH DNA AMP PROBE: CPT

## 2017-07-12 PROCEDURE — 99213 OFFICE O/P EST LOW 20 MIN: CPT | Performed by: OBSTETRICS & GYNECOLOGY

## 2017-07-12 PROCEDURE — 87591 N.GONORRHOEAE DNA AMP PROB: CPT

## 2017-07-12 NOTE — PROGRESS NOTES
Chief Complaint   Patient presents with   • Other     STD testing       History of present illness: 52 y.o. presents with above chief complaint.pt has a new partner and would like to be tested prior to engaging in sexual contact secondary to previous h/o STD with last partner which was cleared after she was no longer seeing that person. Is not having any discharge, abnormal bleeding, pain, dysuria, hematuria, fever, chills, abdominal or pelvic pain    Review of systems:  Pertinent positives documented in HPI and a comprehensive review of system is negative as follows:    Constitutional ROS: No unexpected change in weight, No weakness, No fatigue, No unexplained fevers, sweats, or chills  Mouth/Throat ROS: No bleeding gums, No sore throat, No recent change in voice or hoarseness  Neck ROS: No lumps or masses, No swollen glands, No significant pain in neck  Pulmonary ROS: No chronic cough, sputum, or hemoptysis, No dyspnea on exertion, No wheezing, No shortness of breath, No recent change in breathing  Cardiovascular ROS: No exercise intolerance, No chest pain, No shortness of breath, No palpitations, No syncope  Genitourinary ROS: Negative for dysuria, frequency and incontinence  Gastrointestinal ROS: No abdominal pain, No change in bowel habits, No significant change in appetite, No nausea, vomiting, diarrhea, or constipation, No hematemesis, No abdominal bloating or early satiety  Breast ROS: No new breast lumps or masses, No severe breast pain, No nipple discharge  Musculoskeletal/Extremities ROS: No cyanosis, No peripheral edema, No pain, redness or swelling on the joints  Hematologic/Lymphatic ROS: No anemia, No abnormal bleeding, No chills, No bruising, No weight loss  Skin/Integumentary ROS: No evidence of bleeding or bruising, No abnormal skin lesions noted, No evidence of rash, No itching  Neurologic ROS: No chronic headaches, No seizures, No weakness  Psychiatric ROS: No depression, No anxiety, No  "psychosis    All PMH, PSH, allergies, social history and FH reviewed and updated today:  Past Medical History   Diagnosis Date   • Depression with anxiety    • Guillain-[place] (CMS-[facility])    • Fibromyalgia    • Migraines    • 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 Hx of Guillian [place]   • Rocephin [Ceftriaxone Sodium-Lidocaine] Unspecified     PT WILL NOT TAKE MEDICATION. NOT ALLERGIC.   • Sulfa Drugs Unspecified     Hx of Guillian [place]   • Tape Unspecified     Blister. Paper tape ok per patient   • Tetanus Toxoids Unspecified     Hx of Guillian [place]       Social History     Social History   • Marital Status: Single     Spouse Name: N/A   • Number of Children: N/A   • Years of Education: N/A     Occupational History   • Not on file.     Social History Main Topics   • Smoking status: Former Smoker     Quit date: 11/19/1989   • Smokeless tobacco: Never Used      Comment: avoid all tobacco products   • Alcohol Use: 0.0 oz/week     0 Standard drinks or equivalent per week      Comment: rarely   • Drug Use: No   • Sexual Activity:     Partners: Male      Comment: work: computer      Other Topics Concern   • Not on file     Social History Narrative       Family History   Problem Relation Age of Onset   • Heart Disease Mother    • Heart Disease Father    • Cancer Maternal Grandmother      colon    • Cancer Paternal Grandmother      ovarian       Physical exam:  Blood pressure 102/64, height 1.676 m (5' 5.98\"), weight 93.895 kg (207 " lb).    GENERAL APPEARANCE: healthy, alert, no distress, cooperative, smiling  NECK nontender, no masses, thyromegaly or nodules  ABDOMEN Abdomen soft, non-tender. BS normal. No masses,  No organomegaly  FEMALE GYN: normal female external genitalia without lesions, no vaginal discharge noted, vulva pink without erythema or friability, urethra is normal without discharge or scarring, no bladder fullness or masses, normal vagina and normal vaginal tone, normal cervix, normal  uterus, size and consistency, normal adnexa without tenderness, normal anus and perineum.  No inguinal hernia present.  EXTREMITIES:negative clubbing, cyanosis, edema    NEURO Awake, alert and oriented x 3, Normal gait, no sensory deficits  SKIN No rashes, or ulcers or lesions seen  PSYCHIATRIC: Patient shows appropriate affect, is alert and oriented x3, intact judgment and insight.    1. Screening for STDs (sexually transmitted diseases)  CHLAMYDIA/GC PCR URINE OR SWAB    HEP B SURFACE ANTIGEN    HEP C VIRUS ANTIBODY    RPR (SYPHILIS)    HIV ANTIBODIES     Cultures obtained  Lab work ordered  Will call patient with results or use My Chart.

## 2017-07-12 NOTE — MR AVS SNAPSHOT
"        Keiko Burgos   2017 3:30 PM   Gynecology Visit   MRN: [id number]    Department:  [facility]   Dept Phone:  [phone]    Description:  Female : 1965   Provider:  Marcie Antonio M.D.           Reason for Visit     Other STD testing      Allergies as of 2017     Allergen Noted Reactions    Influenza Vaccines 2016   Unspecified    Hx of Guillian [place]    Keflex 2009   Unspecified    Hx of Guillian [place]    Other Drug 2016   Unspecified    Pneumonia vaccine. Hx of Guillian [place]    Rocephin [Ceftriaxone Sodium-Lidocaine] 2015   Unspecified    PT WILL NOT TAKE MEDICATION. NOT ALLERGIC.    Sulfa Drugs 2009   Unspecified    Hx of Guillian [place]    Tape 2016   Unspecified    Blister. Paper tape ok per patient    Tetanus Toxoids 2016   Unspecified    Hx of Guillian [place]      You were diagnosed with     Screening for STDs (sexually transmitted diseases)   [410568]         Vital Signs     Blood Pressure Height Weight Body Mass Index Smoking Status       102/64 mmHg 1.676 m (5' 5.98\") 93.895 kg (207 lb) 33.43 kg/m2 Former Smoker       Basic Information     Date Of Birth Sex Race Ethnicity Preferred Language    1965 Female White Non- English      Your appointments     Aug 03, 2017  2:20 PM   Established Patient with Meera Guillory D.O.   [facility])    [address] [place] [address], [address]  [facility] [address] [phone]           You will be receiving a confirmation call a few days before your appointment from our automated call confirmation system.              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   4/9/2015 - Present    Mammographic microcalcification found on diagnostic imaging of breast R92.0   4/9/2015 - Present    Depression with anxiety (Chronic) F41.8   2/16/2016 - Present    Lumbar radiculopathy M54.16   8/5/2016 - Present    Lumbar postlaminectomy syndrome M96.1   8/5/2016 - Present    Chronic low back pain (Chronic) M54.5, G89.29   8/5/2016 - Present    Osteoarthritis of spine with radiculopathy, lumbosacral region M47.27   8/5/2016 - Present    Orthostatic hypotension I95.1 High  11/8/2016 - Present      Health Maintenance        Date Due Completion Dates    COLONOSCOPY 6/18/2017 ---    MAMMOGRAM 12/17/2017 12/17/2016, 11/24/2015, 4/14/2015, 11/19/2014, 5/2/2014, 10/30/2013, 10/23/2013    IMM DTaP/Tdap/Td Vaccine (2 - Td) 4/9/2025 4/9/2015            Current Immunizations     Tdap Vaccine 4/9/2015      Below and/or attached are the medications your provider expects you to take. Review all of your home medications and newly ordered medications with your provider and/or pharmacist. Follow medication instructions as directed by your provider and/or pharmacist. Please keep your medication list with you and share with your provider. Update the information when medications are discontinued, doses are changed, or new medications (including over-the-counter products) are added; and carry medication information at all times in the event of emergency situations     Allergies:  INFLUENZA VACCINES - Unspecified     KEFLEX - Unspecified     OTHER DRUG - Unspecified     ROCEPHIN - Unspecified     SULFA DRUGS - Unspecified     TAPE - Unspecified     TETANUS TOXOIDS - Unspecified               Medications  Valid as of: July 12, 2017 -  3:57 PM    Generic Name Brand Name Tablet Size Instructions for use    Albuterol Sulfate (Aero Soln) albuterol 108 (90 BASE) MCG/ACT Inhale 2 Puffs by mouth every 6 hours as needed for Shortness of Breath.        Amoxicillin-Pot Clavulanate (Tab) AUGMENTIN 875-125 MG Take 1  Tab by mouth 2 times a day for 10 days.        B Complex-C (Tab) B-complex with vitamin C  Take 1 Tab by mouth every evening.        Citalopram Hydrobromide (Tab) CELEXA 20 MG Take 20 mg by mouth every evening.        Coenzyme Q10   Take 1 Cap by mouth every evening.        Dronabinol (Cap) MARINOL 5 MG Take 5 mg by mouth every bedtime.        Fludrocortisone Acetate (Tab) FLORINEF 0.1 MG Take 1 Tab by mouth every morning.        GuaiFENesin   Take  by mouth.        Hydrocortisone (Tab) CORTEF 10 MG Take 1 Tab by mouth 2 times a day.        Ibuprofen (Tab) MOTRIN 800 MG Take 800 mg by mouth every 8 hours as needed for Moderate Pain.        Nabumetone (Tab) RELAFEN 500 MG Take 500 mg by mouth 2 times a day as needed for Moderate Pain.        Phenylephrine-Acetaminophen   Take  by mouth.        .                 Medicines prescribed today were sent to:     [facility] [employer] [address] [place] [address], [place] [address] [facility] [employer] [place] [address] [address] [place] [address]    Phone: [phone] Fax: [phone]    Open 24 Hours?: No      Medication refill instructions:       If your prescription bottle indicates you have medication refills left, it is not necessary to call your provider’s office. Please contact your pharmacy and they will refill your medication.    If your prescription bottle indicates you do not have any refills left, you may request refills at any time through one of the following ways: The online [employer] system (except Urgent Care), by calling your provider’s office, or by asking your pharmacy to contact your provider’s office with a refill request. Medication refills are processed only during regular business hours and may not be available until the next business day. Your provider may request additional information or to have a follow-up visit with you prior to refilling your medication.   *Please Note: Medication refills are assigned a new Rx number when refilled electronically. Your  pharmacy may indicate that no refills were authorized even though a new prescription for the same medication is available at the pharmacy. Please request the medicine by name with the pharmacy before contacting your provider for a refill.        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

## 2017-07-13 ENCOUNTER — HOSPITAL ENCOUNTER (OUTPATIENT)
Dept: LAB | Facility: MEDICAL CENTER | Age: 52
End: 2017-07-13
Attending: OBSTETRICS & GYNECOLOGY
Payer: COMMERCIAL

## 2017-07-13 DIAGNOSIS — Z11.3 SCREENING FOR STDS (SEXUALLY TRANSMITTED DISEASES): ICD-10-CM

## 2017-07-13 LAB
C TRACH DNA SPEC QL NAA+PROBE: NEGATIVE
HBV SURFACE AG SER QL: NEGATIVE
HCV AB SER QL: NEGATIVE
HIV 1+2 AB+HIV1 P24 AG SERPL QL IA: NON REACTIVE
N GONORRHOEA DNA SPEC QL NAA+PROBE: NEGATIVE
SPECIMEN SOURCE: NORMAL
TREPONEMA PALLIDUM IGG+IGM AB [PRESENCE] IN SERUM OR PLASMA BY IMMUNOASSAY: NON REACTIVE

## 2017-07-13 PROCEDURE — 36415 COLL VENOUS BLD VENIPUNCTURE: CPT

## 2017-07-13 PROCEDURE — 86803 HEPATITIS C AB TEST: CPT

## 2017-07-13 PROCEDURE — 87340 HEPATITIS B SURFACE AG IA: CPT

## 2017-07-13 PROCEDURE — 87389 HIV-1 AG W/HIV-1&-2 AB AG IA: CPT

## 2017-07-13 PROCEDURE — 86780 TREPONEMA PALLIDUM: CPT

## 2017-08-03 ENCOUNTER — OFFICE VISIT (OUTPATIENT)
Dept: MEDICAL GROUP | Facility: PHYSICIAN GROUP | Age: 52
End: 2017-08-03
Payer: COMMERCIAL

## 2017-08-03 VITALS
HEART RATE: 72 BPM | OXYGEN SATURATION: 94 % | BODY MASS INDEX: 33.43 KG/M2 | TEMPERATURE: 98.2 F | WEIGHT: 208 LBS | DIASTOLIC BLOOD PRESSURE: 60 MMHG | HEIGHT: 66 IN | SYSTOLIC BLOOD PRESSURE: 90 MMHG

## 2017-08-03 DIAGNOSIS — Z12.11 SCREENING FOR COLON CANCER: ICD-10-CM

## 2017-08-03 DIAGNOSIS — G47.34 NOCTURNAL HYPOXIA: ICD-10-CM

## 2017-08-03 DIAGNOSIS — Z13.6 SCREENING FOR CARDIOVASCULAR CONDITION: ICD-10-CM

## 2017-08-03 DIAGNOSIS — D22.9 CHANGING NEVUS: ICD-10-CM

## 2017-08-03 DIAGNOSIS — E66.9 OBESITY (BMI 30-39.9): ICD-10-CM

## 2017-08-03 DIAGNOSIS — M79.7 FIBROMYALGIA: ICD-10-CM

## 2017-08-03 PROCEDURE — 99214 OFFICE O/P EST MOD 30 MIN: CPT | Performed by: FAMILY MEDICINE

## 2017-08-03 RX ORDER — CITALOPRAM 40 MG/1
40 TABLET ORAL DAILY
COMMUNITY

## 2017-08-03 RX ORDER — GABAPENTIN 400 MG/1
400 CAPSULE ORAL
COMMUNITY
Start: 2017-07-07

## 2017-08-03 ASSESSMENT — PATIENT HEALTH QUESTIONNAIRE - PHQ9: CLINICAL INTERPRETATION OF PHQ2 SCORE: 0

## 2017-08-03 NOTE — PROGRESS NOTES
Subjective:     Chief Complaint   Patient presents with   • Skin Lesion     spots on legs and (R) shoulder       Keiko Burgos is a 52 y.o. female here today to est care with new provider. Patient is a former patient of Dr. Louie who is since retired.  Pt reports changing skin lesion on L lat LE and  R shoudler.     Pt is followed by Dr. Damico for pain mgmt for low back pain d/t arthritis and fibromyaglia. Pt is using marijuana for pain and insomnia.     Pt is using 5HTP and Celexa for mood.  Pt denies anxiety, depression, hopelessness, helplessness, and SI.    Pt reports chronic fatigue syndrome improved with celexa and gabapentin.     Pt has a hx of orthostatic hypotension.  Pt uses Florinef PRN for episodes of vertigo which occur less than once a month.      Pt uses O2 at night.  She reports hx of STAN.   Allergies   Allergen Reactions   • Influenza Vaccines Unspecified     Hx of Guillian [place]   • Keflex Unspecified     Hx of Guillian [place]   • Other Drug Unspecified     Pneumonia vaccine. Hx of [place]illian [place]   • Rocephin [Ceftriaxone Sodium-Lidocaine] Unspecified     PT WILL NOT TAKE MEDICATION. NOT ALLERGIC.   • Sulfa Drugs Unspecified     Hx of Guillian [place]   • Tape Unspecified     Blister. Paper tape ok per patient   • Tetanus Toxoids Unspecified     Hx of Guillian [place]     Current medicines (including changes today)  Current Outpatient Prescriptions   Medication Sig Dispense Refill   • MAGNESIUM GLUCONATE PO Take  by mouth.     • citalopram (CELEXA) 40 MG Tab Take 40 mg by mouth every day.     • albuterol 108 (90 BASE) MCG/ACT Aero Soln inhalation aerosol Inhale 2 Puffs by mouth every 6 hours as needed for Shortness of Breath. 8.5 g 0   • B Complex-C (B-COMPLEX WITH VITAMIN C) tablet Take 1 Tab by mouth every evening.     • Coenzyme Q10 (CO Q 10 PO) Take 1 Cap by mouth every evening.     • nabumetone (RELAFEN) 500 MG Tab Take 500 mg by mouth 2 times a day as needed for Moderate Pain.     •  "gabapentin (NEURONTIN) 400 MG Cap Take 400 mg by mouth every day.       No current facility-administered medications for this visit.     Social History   Substance Use Topics   • Smoking status: Former Smoker     Quit date: 11/19/1989   • Smokeless tobacco: Never Used      Comment: avoid all tobacco products   • Alcohol Use: 0.0 oz/week     0 Standard drinks or equivalent per week      Comment: rarely     Family Status   Relation Status Death Age   • Mother Alive    • Father Alive      dementia   • Maternal Grandmother Alive    • Paternal Grandmother Alive      Family History   Problem Relation Age of Onset   • Heart Disease Mother    • Heart Disease Father    • Cancer Maternal Grandmother      colon    • Cancer Paternal Grandmother      ovarian     She    has a past medical history of Depression with anxiety; Guillain-[place] (CMS-[facility]) (4//2015); Fibromyalgia; Migraines; Anemia; H/O bladder infections; H/O infectious mononucleosis; H/O meningitis; and H/O: pneumonia.        ROS   GEN: no weight loss, fevers, or chills  HEENT: no blurry vision or change in vision, no ear pain, no difficulty swallowing, no throat pain, no runny nose, no nasal congestion  Resp: no shortness of breath, no cough  CV: no racing heart, no irregular beats, no chest pain  Abd: no nausea, no vomiting, no diarrhea, no constipation, no blood in stool, no dark stools, no incontinence  : no dysuria, no hematuria, no urinary incontinence, no increased frequency  MSK: as per HPI  Neuro: no headaches, no dizziness, no LOC, no weakness, no numbness/tingling       Objective:     Blood pressure 90/60, pulse 72, temperature 36.8 °C (98.2 °F), height 1.676 m (5' 5.98\"), weight 94.348 kg (208 lb), SpO2 94 %. Body mass index is 33.59 kg/(m^2).   Physical Exam:  Constitutional: Alert, no distress.  Skin: Warm, dry, good turgor, no rashes in visible areas, L lat LE 0.5cm raised, hyperpigmented, raised lesion with irregular borders, right upper back on " scapula there is a 0.2 cm scaly irregular hyperpigmented lesion  Eye: Equal, round and reactive, conjunctiva clear, lids normal.  ENMT: Lips without lesions, oropharynx non-erythematous, no exudate, moist oral mucosa, bilateral tympanic membranes: No bulging, no retraction, no fluid, nonerythematous, positive light reflex, external auditory canals: Clear, scant cerumen, nonerythematous  Neck: Trachea midline, no masses, no thyromegaly. No cervical or supraclavicular lymphadenopathy. Full ROM  Respiratory: Unlabored respiratory effort, good air movement, lungs clear to auscultation, no wheezes, no ronchi.  Cardiovascular:RRR, +S1, S2, no murmur, no peripheral edema, pedal and radial pulses equal and intact bilat  Abdomen: Soft, non-tender, no masses, no hepatosplenomegaly.  MSK:5/5 muscle strength in upper extremities as well as lower extremity bilaterally  Psych: Alert and oriented, appropriate affect and mood.  Neuro: CN2-12 intact, no gross motor or sensory deficits      Assessment and Plan:   The following treatment plan was discussed    1. Nocturnal hypoxia  Based on hx recommend sleep study  - REFERRAL TO SLEEP STUDIES    2. Fibromyalgia  Chronic: Continue follow-up with pain management    3. Obesity (BMI 30-39.9)  Pt educated on the increase of morbidity and mortality associated with excess weight including DM, Heart Disease, HTN, stroke, and sleep apnea.  Pt advised weight loss of 5% through reduced calorie, low fat diet and 150 mins of exercise a week  - Patient identified as having weight management issue.  Appropriate orders and counseling given.  - COMP METABOLIC PANEL; Future    4. Changing nevus  Recommend follow-up with dermatology. Patient states she will call her dermatologist this week.    5. Screening for colon cancer  - REFERRAL TO GI FOR COLONOSCOPY    6. Screening for cardiovascular condition  Lipid panel ordered        Followup: Return in about 1 year (around 8/3/2018) for Annual.    Please  note that this dictation was created using voice recognition software. I have made every reasonable attempt to correct obvious errors, but I expect that there are errors of grammar and possibly content that I did not discover before finalizing the note.

## 2017-08-03 NOTE — MR AVS SNAPSHOT
"        Keiko Burgos   8/3/2017 2:20 PM   Office Visit   MRN: [id number]    Department:  [facility]   Dept Phone:  [phone]    Description:  Female : 1965   Provider:  Meera Guillory D.O.           Reason for Visit     Skin Lesion spots on legs and (R) shoulder      Allergies as of 8/3/2017     Allergen Noted Reactions    Influenza Vaccines 2016   Unspecified    Hx of Guillian [place]    Keflex 2009   Unspecified    Hx of Guillian [place]    Other Drug 2016   Unspecified    Pneumonia vaccine. Hx of Guillian [place]    Rocephin [Ceftriaxone Sodium-Lidocaine] 2015   Unspecified    PT WILL NOT TAKE MEDICATION. NOT ALLERGIC.    Sulfa Drugs 2009   Unspecified    Hx of Guillian [place]    Tape 2016   Unspecified    Blister. Paper tape ok per patient    Tetanus Toxoids 2016   Unspecified    Hx of Guillian [place]      You were diagnosed with     Nocturnal hypoxia   [170570]       Fibromyalgia   [779647]       Obesity (BMI 30-39.9)   [569156]       Changing nevus   [211900]       Screening for colon cancer   [841962]       Screening for cardiovascular condition   [291685]         Vital Signs     Blood Pressure Pulse Temperature Height Weight Body Mass Index    90/60 mmHg 72 36.8 °C (98.2 °F) 1.676 m (5' 5.98\") 94.348 kg (208 lb) 33.59 kg/m2    Oxygen Saturation Smoking Status                94% Former Smoker          Basic Information     Date Of Birth Sex Race Ethnicity Preferred Language    1965 Female White Non- English      Your appointments     Aug 04, 2017  7:15 AM   Adult Draw/Collection with [facility] [place]   [facility] (--)    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   2015 - Present   " Mammographic microcalcification found on diagnostic imaging of breast R92.0   4/9/2015 - Present    Depression with anxiety (Chronic) F41.8   2/16/2016 - Present    Osteoarthritis of spine with radiculopathy, lumbosacral region M47.27   8/5/2016 - Present    Orthostatic hypotension I95.1 High  11/8/2016 - Present    Fibromyalgia M79.7   8/3/2017 - Present    Obesity (BMI 30-39.9) E66.9   8/3/2017 - Present      Health Maintenance        Date Due Completion Dates    COLONOSCOPY 6/18/2017 ---    MAMMOGRAM 12/17/2017 12/17/2016, 11/24/2015, 4/14/2015, 11/19/2014, 5/2/2014, 10/30/2013, 10/23/2013    IMM DTaP/Tdap/Td Vaccine (2 - Td) 4/9/2025 4/9/2015            Current Immunizations     Tdap Vaccine 4/9/2015      Below and/or attached are the medications your provider expects you to take. Review all of your home medications and newly ordered medications with your provider and/or pharmacist. Follow medication instructions as directed by your provider and/or pharmacist. Please keep your medication list with you and share with your provider. Update the information when medications are discontinued, doses are changed, or new medications (including over-the-counter products) are added; and carry medication information at all times in the event of emergency situations     Allergies:  INFLUENZA VACCINES - Unspecified     KEFLEX - Unspecified     OTHER DRUG - Unspecified     ROCEPHIN - Unspecified     SULFA DRUGS - Unspecified     TAPE - Unspecified     TETANUS TOXOIDS - Unspecified               Medications  Valid as of: August 03, 2017 -  2:58 PM    Generic Name Brand Name Tablet Size Instructions for use    Albuterol Sulfate (Aero Soln) albuterol 108 (90 BASE) MCG/ACT Inhale 2 Puffs by mouth every 6 hours as needed for Shortness of Breath.        B Complex-C (Tab) B-complex with vitamin C  Take 1 Tab by mouth every evening.        Citalopram Hydrobromide (Tab) CELEXA 40 MG Take 40 mg by mouth every day.        Coenzyme Q10    Take 1 Cap by mouth every evening.        Gabapentin (Cap) NEURONTIN 400 MG Take 400 mg by mouth every day.        Magnesium Gluconate   Take  by mouth.        Nabumetone (Tab) RELAFEN 500 MG Take 500 mg by mouth 2 times a day as needed for Moderate Pain.        .                 Medicines prescribed today were sent to:     [facility] [address] [place] [address] [place], [place] - [address] [facility] [place] [employer] [facility] [address] [place]    [address] [place] [address]    Phone: [phone] Fax: [phone]    Open 24 Hours?: No      Medication refill instructions:       If your prescription bottle indicates you have medication refills left, it is not necessary to call your provider’s office. Please contact your pharmacy and they will refill your medication.    If your prescription bottle indicates you do not have any refills left, you may request refills at any time through one of the following ways: The online [employer] system (except Urgent Care), by calling your provider’s office, or by asking your pharmacy to contact your provider’s office with a refill request. Medication refills are processed only during regular business hours and may not be available until the next business day. Your provider may request additional information or to have a follow-up visit with you prior to refilling your medication.   *Please Note: Medication refills are assigned a new Rx number when refilled electronically. Your pharmacy may indicate that no refills were authorized even though a new prescription for the same medication is available at the pharmacy. Please request the medicine by name with the pharmacy before contacting your provider for a refill.        Your To Do List     Future Labs/Procedures Complete By Expires    COMP METABOLIC PANEL  As directed 8/3/2018    LIPID PROFILE  As directed 8/3/2018      Referral     A referral request has been sent to our patient care coordination department. Please allow 3-5 business days for us to process this  request and contact you either by phone or mail. If you do not hear from us by the 5th business day, please call us at [phone].        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

## 2017-08-04 ENCOUNTER — HOSPITAL ENCOUNTER (OUTPATIENT)
Dept: LAB | Facility: MEDICAL CENTER | Age: 52
End: 2017-08-04
Attending: FAMILY MEDICINE
Payer: COMMERCIAL

## 2017-08-04 DIAGNOSIS — Z13.6 SCREENING FOR CARDIOVASCULAR CONDITION: ICD-10-CM

## 2017-08-04 DIAGNOSIS — E66.9 OBESITY (BMI 30-39.9): ICD-10-CM

## 2017-08-04 LAB
ALBUMIN SERPL BCP-MCNC: 3.5 G/DL (ref 3.2–4.9)
ALBUMIN/GLOB SERPL: 1.3 G/DL
ALP SERPL-CCNC: 60 U/L (ref 30–99)
ALT SERPL-CCNC: 18 U/L (ref 2–50)
ANION GAP SERPL CALC-SCNC: 5 MMOL/L (ref 0–11.9)
AST SERPL-CCNC: 16 U/L (ref 12–45)
BILIRUB SERPL-MCNC: 0.4 MG/DL (ref 0.1–1.5)
BUN SERPL-MCNC: 11 MG/DL (ref 8–22)
CALCIUM SERPL-MCNC: 8.8 MG/DL (ref 8.5–10.5)
CHLORIDE SERPL-SCNC: 104 MMOL/L (ref 96–112)
CHOLEST SERPL-MCNC: 154 MG/DL (ref 100–199)
CO2 SERPL-SCNC: 28 MMOL/L (ref 20–33)
CREAT SERPL-MCNC: 0.8 MG/DL (ref 0.5–1.4)
GFR SERPL CREATININE-BSD FRML MDRD: >60 ML/MIN/1.73 M 2
GLOBULIN SER CALC-MCNC: 2.6 G/DL (ref 1.9–3.5)
GLUCOSE SERPL-MCNC: 71 MG/DL (ref 65–99)
HDLC SERPL-MCNC: 39 MG/DL
LDLC SERPL CALC-MCNC: 99 MG/DL
POTASSIUM SERPL-SCNC: 4 MMOL/L (ref 3.6–5.5)
PROT SERPL-MCNC: 6.1 G/DL (ref 6–8.2)
SODIUM SERPL-SCNC: 137 MMOL/L (ref 135–145)
TRIGL SERPL-MCNC: 80 MG/DL (ref 0–149)

## 2017-08-04 PROCEDURE — 36415 COLL VENOUS BLD VENIPUNCTURE: CPT

## 2017-08-04 PROCEDURE — 80061 LIPID PANEL: CPT

## 2017-08-04 PROCEDURE — 80053 COMPREHEN METABOLIC PANEL: CPT

## 2017-09-20 ENCOUNTER — OFFICE VISIT (OUTPATIENT)
Dept: MEDICAL GROUP | Facility: PHYSICIAN GROUP | Age: 52
End: 2017-09-20
Payer: COMMERCIAL

## 2017-09-20 VITALS
RESPIRATION RATE: 16 BRPM | SYSTOLIC BLOOD PRESSURE: 100 MMHG | OXYGEN SATURATION: 98 % | HEART RATE: 58 BPM | HEIGHT: 66 IN | WEIGHT: 208 LBS | DIASTOLIC BLOOD PRESSURE: 62 MMHG | TEMPERATURE: 98.1 F | BODY MASS INDEX: 33.43 KG/M2

## 2017-09-20 DIAGNOSIS — J32.1 CHRONIC FRONTAL SINUSITIS: ICD-10-CM

## 2017-09-20 PROCEDURE — 99213 OFFICE O/P EST LOW 20 MIN: CPT | Performed by: FAMILY MEDICINE

## 2017-09-20 RX ORDER — AMOXICILLIN AND CLAVULANATE POTASSIUM 875; 125 MG/1; MG/1
1 TABLET, FILM COATED ORAL 2 TIMES DAILY
Qty: 28 TAB | Refills: 0 | Status: SHIPPED | OUTPATIENT
Start: 2017-09-20 | End: 2017-10-04

## 2017-09-20 NOTE — PROGRESS NOTES
Subjective:     Chief Complaint   Patient presents with   • Ear Fullness     pianful, both sides, throat hurts, poss ear infection       Keiko Burgos is a 52 y.o. female here today for bilateral ear fullness. Patient reports she has noticed symptoms for approximately 5 days.  Patient reports mental sinus tenderness, nasal congestion, nasal discharge that is yellow, rhinorrhea,  nasal sinus pain, headache, ear fullness, ear pressure, Pt denies productive cough sputum, shortness of breath, generalized malaise, fevers, chills, sore throat, and itchy throat.      Allergies   Allergen Reactions   • Influenza Vaccines Unspecified     Hx of Guillian Edson   • Keflex Unspecified     Hx of Guillian Edson   • Other Drug Unspecified     Pneumonia vaccine. Hx of Guillian Edson   • Rocephin [Ceftriaxone Sodium-Lidocaine] Unspecified     PT WILL NOT TAKE MEDICATION. NOT ALLERGIC.   • Sulfa Drugs Unspecified     Hx of Guillian Edson   • Tape Unspecified     Blister. Paper tape ok per patient   • Tetanus Toxoids Unspecified     Hx of Guillian Edson     Current medicines (including changes today)  Current Outpatient Prescriptions   Medication Sig Dispense Refill   • amoxicillin-clavulanate (AUGMENTIN) 875-125 MG Tab Take 1 Tab by mouth 2 times a day for 14 days. 28 Tab 0   • MAGNESIUM GLUCONATE PO Take  by mouth.     • citalopram (CELEXA) 40 MG Tab Take 40 mg by mouth every day.     • gabapentin (NEURONTIN) 400 MG Cap Take 400 mg by mouth every day.     • albuterol 108 (90 BASE) MCG/ACT Aero Soln inhalation aerosol Inhale 2 Puffs by mouth every 6 hours as needed for Shortness of Breath. 8.5 g 0   • B Complex-C (B-COMPLEX WITH VITAMIN C) tablet Take 1 Tab by mouth every evening.     • Coenzyme Q10 (CO Q 10 PO) Take 1 Cap by mouth every evening.     • nabumetone (RELAFEN) 500 MG Tab Take 500 mg by mouth 2 times a day as needed for Moderate Pain.       No current facility-administered medications for this visit.      Social  "History   Substance Use Topics   • Smoking status: Former Smoker     Quit date: 11/19/1989   • Smokeless tobacco: Never Used      Comment: avoid all tobacco products   • Alcohol use 0.0 oz/week      Comment: rarely     Family Status   Relation Status   • Mother Alive   • Father Alive    dementia   • Maternal Grandmother Alive   • Paternal Grandmother Alive     Family History   Problem Relation Age of Onset   • Heart Disease Mother    • Heart Disease Father    • Cancer Maternal Grandmother      colon    • Cancer Paternal Grandmother      ovarian     She    has a past medical history of Anemia; Depression with anxiety; Fibromyalgia; 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        ROS   GEN: no weight loss, fevers, or chills  HEENT: no blurry vision or change in vision, + ear pain, no difficulty swallowing, no throat pain, + runny nose, + nasal congestion  Resp: no shortness of breath, no cough  CV: no racing heart, no irregular beats, no chest pain  Abd: no nausea, no vomiting, no diarrhea, no constipation, no blood in stool, no dark stools, no incontinence  MSK: no muscle aches, no joint pain, no limited motion  Neuro: no headaches, no dizziness, no LOC, no weakness, no numbness/tingling       Objective:     Blood pressure 100/62, pulse (!) 58, temperature 36.7 °C (98.1 °F), resp. rate 16, height 1.676 m (5' 6\"), weight 94.3 kg (208 lb), SpO2 98 %. Body mass index is 33.57 kg/m².   Physical Exam:  Constitutional: Alert, no distress.  Skin: Warm, dry, good turgor, no rashes in visible areas.  Eye: Equal, round and reactive, conjunctiva clear, lids normal.  ENMT: Lips without lesions, Frontal sinus tenderness, maxillary sinus tenderness, oropharynx erythematous, no exudate, moist oral mucosa, bilateral tympanic membranes: No bulging, no retraction, + Nonpurulent fluid, nonerythematous, dull light reflex, external auditory " canals: Clear, scant cerumen, nonerythematous  Neck: Trachea midline, no masses, no thyromegaly. No cervical or supraclavicular lymphadenopathy. Full ROM  Respiratory: Unlabored respiratory effort, good air movement, lungs clear to auscultation, no wheezes, no ronchi.  Cardiovascular:RRR, +S1, S2, no murmur, no peripheral edema, pedal and radial pulses equal and intact bilat  Abdomen: Soft, non-tender, no masses, no hepatosplenomegaly.  Psych: Alert and oriented, appropriate affect and mood.  Neuro: CN2-12 intact, no gross motor or sensory deficits      Assessment and Plan:   The following treatment plan was discussed    1. Chronic frontal sinusitis  Patient reports a chronic sinus infection. Patient reports current symptoms are very similar to what she has periods of sinus infections in the past. Patient was advised to call ENT for recurrence but has been unable to schedule.  - amoxicillin-clavulanate (AUGMENTIN) 875-125 MG Tab; Take 1 Tab by mouth 2 times a day for 14 days.  Dispense: 28 Tab; Refill: 0      Followup: Return if symptoms worsen or fail to improve.    Please note that this dictation was created using voice recognition software. I have made every reasonable attempt to correct obvious errors, but I expect that there are errors of grammar and possibly content that I did not discover before finalizing the note.

## 2017-11-02 ENCOUNTER — OFFICE VISIT (OUTPATIENT)
Dept: MEDICAL GROUP | Facility: PHYSICIAN GROUP | Age: 52
End: 2017-11-02
Payer: COMMERCIAL

## 2017-11-02 VITALS
HEART RATE: 60 BPM | OXYGEN SATURATION: 95 % | RESPIRATION RATE: 16 BRPM | WEIGHT: 202.7 LBS | HEIGHT: 66 IN | DIASTOLIC BLOOD PRESSURE: 60 MMHG | TEMPERATURE: 97.6 F | BODY MASS INDEX: 32.58 KG/M2 | SYSTOLIC BLOOD PRESSURE: 110 MMHG

## 2017-11-02 DIAGNOSIS — F43.21 GRIEF REACTION: ICD-10-CM

## 2017-11-02 PROCEDURE — 99213 OFFICE O/P EST LOW 20 MIN: CPT | Performed by: NURSE PRACTITIONER

## 2017-11-02 ASSESSMENT — ENCOUNTER SYMPTOMS
CHILLS: 0
NAUSEA: 0
NERVOUS/ANXIOUS: 1
WHEEZING: 0
SPUTUM PRODUCTION: 0
INSOMNIA: 1
FEVER: 0
VOMITING: 0
HALLUCINATIONS: 0
HEADACHES: 1
COUGH: 0
SHORTNESS OF BREATH: 0
PALPITATIONS: 0
DEPRESSION: 1

## 2017-11-02 ASSESSMENT — LIFESTYLE VARIABLES: SUBSTANCE_ABUSE: 0

## 2017-11-02 ASSESSMENT — PATIENT HEALTH QUESTIONNAIRE - PHQ9
SUM OF ALL RESPONSES TO PHQ QUESTIONS 1-9: 16
CLINICAL INTERPRETATION OF PHQ2 SCORE: 3
5. POOR APPETITE OR OVEREATING: 2 - MORE THAN HALF THE DAYS

## 2017-11-02 NOTE — PROGRESS NOTES
Subjective:   Keiko Burgos is a 52 y.o. female here today for depression. Her current PCP is Dr Bonds.     Depression  Patient complains of increased feelings of depression following death of her  on 10/5/17. She found him unresponsive at home due to stroke and she is having difficulty coping. She complains of depressed mood, anhedonia, insomnia, fatigue and feelings of worthlessness/guilt. She denies current suicidal and homicidal plan or intent. Reports that current symptoms are affecting her ability to perform ADLs, as well as her work performance. She finds support from her daughter and friends. She does have history of chronic depression that is currently managed with Celexa 40mg daily. She is interested in counseling today.      Current medicines (including changes today)  Current Outpatient Prescriptions   Medication Sig Dispense Refill   • MAGNESIUM GLUCONATE PO Take  by mouth.     • citalopram (CELEXA) 40 MG Tab Take 40 mg by mouth every day.     • gabapentin (NEURONTIN) 400 MG Cap Take 400 mg by mouth every day.     • B Complex-C (B-COMPLEX WITH VITAMIN C) tablet Take 1 Tab by mouth every evening.     • Coenzyme Q10 (CO Q 10 PO) Take 1 Cap by mouth every evening.     • nabumetone (RELAFEN) 500 MG Tab Take 500 mg by mouth 2 times a day as needed for Moderate Pain.     • albuterol 108 (90 BASE) MCG/ACT Aero Soln inhalation aerosol Inhale 2 Puffs by mouth every 6 hours as needed for Shortness of Breath. 8.5 g 0     No current facility-administered medications for this visit.      She  has a past medical history of Anemia; Depression with anxiety; Fibromyalgia; Guillain-[place] (CMS-[facility]) (4//2015); H/O bladder infections; H/O infectious mononucleosis; H/O meningitis; H/O: pneumonia; and Migraines.    Social History     Social History   • Marital status: Single     Spouse name: N/A   • Number of children: N/A   • Years of education: N/A     Occupational History   • Not on file.     Social  History Main Topics   • Smoking status: Former Smoker     Quit date: 11/19/1989   • Smokeless tobacco: Never Used      Comment: avoid all tobacco products   • Alcohol use 0.0 oz/week      Comment: rarely   • Drug use:      Types: Marijuana      Comment: every day to sleep    • Sexual activity: Yes     Partners: Male      Comment: work: computer      Other Topics Concern   • Not on file     Social History Narrative   • No narrative on file       Review of Systems   Constitutional: Positive for malaise/fatigue. Negative for chills and fever.   Respiratory: Negative for cough, sputum production, shortness of breath and wheezing.    Cardiovascular: Negative for chest pain, palpitations and leg swelling.   Gastrointestinal: Negative for nausea and vomiting.   Neurological: Positive for headaches.   Psychiatric/Behavioral: Positive for depression. Negative for hallucinations, substance abuse and suicidal ideas. The patient is nervous/anxious and has insomnia.        Depression Screening    Little interest or pleasure in doing things?  1 - several days  Feeling down, depressed , or hopeless? 2 - more than half the days  Trouble falling or staying asleep, or sleeping too much?  3 - nearly every day  Feeling tired or having little energy?  3 - nearly every day  Poor appetite or overeating?  2 - more than half the days  Feeling bad about yourself - or that you are a failure or have let yourself or your family down? 2 - more than half the days  Trouble concentrating on things, such as reading the newspaper or watching television? 2 - more than half the days  Moving or speaking so slowly that other people could have noticed.  Or the opposite - being so fidgety or restless that you have been moving around a lot more than usual?  1 - several days  Thoughts that you would be better off dead, or of hurting yourself?  0 - not at all  Patient Health Questionnaire Score: 16, moderately severe       Objective:     Blood pressure  "110/60, pulse 60, temperature 36.4 °C (97.6 °F), resp. rate 16, height 1.676 m (5' 6\"), weight 91.9 kg (202 lb 11.2 oz), SpO2 95 %. Body mass index is 32.72 kg/m².     Physical Exam   Constitutional: She is oriented to person, place, and time and well-developed, well-nourished, and in no distress. No distress.   HENT:   Head: Normocephalic and atraumatic.   Eyes: Conjunctivae and EOM are normal. Pupils are equal, round, and reactive to light.   Neck: Normal range of motion. Neck supple.   Cardiovascular: Normal rate, regular rhythm, normal heart sounds and intact distal pulses.    No murmur heard.  Pulmonary/Chest: Effort normal and breath sounds normal. No respiratory distress. She has no wheezes.   Musculoskeletal: Normal range of motion.   Neurological: She is alert and oriented to person, place, and time. Gait normal.   Skin: Skin is warm and dry.   Psychiatric: Memory and judgment normal. Her affect is blunt. She exhibits a depressed mood. She expresses no homicidal and no suicidal ideation.     Assessment and Plan:   The following treatment plan was discussed    1. Grief reaction  Referral for counseling given today. Continue Celexa as prescribed; she declined ajustment of medications today. RTC with worsening symptoms, including suicidal or homicidal ideation.  - REFERRAL TO BEHAVIORAL HEALTH      Followup: Return if symptoms worsen or fail to improve.           "

## 2017-11-08 ENCOUNTER — APPOINTMENT (OUTPATIENT)
Dept: SLEEP MEDICINE | Facility: MEDICAL CENTER | Age: 52
End: 2017-11-08
Payer: COMMERCIAL

## 2017-11-22 ENCOUNTER — OFFICE VISIT (OUTPATIENT)
Dept: URGENT CARE | Facility: PHYSICIAN GROUP | Age: 52
End: 2017-11-22
Payer: COMMERCIAL

## 2017-11-22 VITALS
WEIGHT: 197 LBS | RESPIRATION RATE: 15 BRPM | BODY MASS INDEX: 31.8 KG/M2 | DIASTOLIC BLOOD PRESSURE: 64 MMHG | HEART RATE: 88 BPM | OXYGEN SATURATION: 96 % | SYSTOLIC BLOOD PRESSURE: 90 MMHG | TEMPERATURE: 98.4 F

## 2017-11-22 DIAGNOSIS — R51.9 ACUTE NONINTRACTABLE HEADACHE, UNSPECIFIED HEADACHE TYPE: ICD-10-CM

## 2017-11-22 PROCEDURE — 99214 OFFICE O/P EST MOD 30 MIN: CPT | Performed by: PHYSICIAN ASSISTANT

## 2017-11-22 RX ORDER — KETOROLAC TROMETHAMINE 30 MG/ML
60 INJECTION, SOLUTION INTRAMUSCULAR; INTRAVENOUS ONCE
Status: COMPLETED | OUTPATIENT
Start: 2017-11-22 | End: 2017-11-22

## 2017-11-22 RX ADMIN — KETOROLAC TROMETHAMINE 60 MG: 30 INJECTION, SOLUTION INTRAMUSCULAR; INTRAVENOUS at 10:06

## 2017-11-22 NOTE — LETTER
November 22, 2017         Patient: Keiko Burgos   YOB: 1965   Date of Visit: 11/22/2017           To Whom it May Concern:    Keiko Burgos was seen in my clinic on 11/22/2017. She is to be off today to recover.  Please excuse her from missed work yesterday.     If you have any questions or concerns, please don't hesitate to call.        Sincerely,           Nathalia Yost P.A.-C.  Electronically Signed

## 2017-11-22 NOTE — PROGRESS NOTES
Chief Complaint   Patient presents with   • Headache     x 4 days. Some cough.       HISTORY OF PRESENT ILLNESS: Patient is a 52 y.o. female who presents today for 4 days of sinus congestion and headache.  She did take some aspirin, migraine medication and other OTC but not helping.   The headache is diffuse. Ice does seem to help slightly.  Is not having a cough at this point.  She is having pressure when she puts her head down.   She has felt she had elevated temp yesterday but not so much this morning.   Mild nausea, no vomiting.  No sore throat.  No numbness, tingling, weakness, vision changes or dizziness.  Notes she is under a lot of stress due to recent bereavement.     Patient Active Problem List    Diagnosis Date Noted   • Orthostatic hypotension 11/08/2016     Priority: High   • Fibromyalgia 08/03/2017   • Obesity (BMI 30-39.9) 08/03/2017   • Osteoarthritis of spine with radiculopathy, lumbosacral region 08/05/2016   • Depression with anxiety 02/16/2016   • History of Guillain-[place] syndrome 04/09/2015   • Mammographic microcalcification found on diagnostic imaging of breast 04/09/2015   • Insomnia 04/17/2014   • Chronic fatigue 09/23/2009       Allergies:Influenza vaccines; Keflex; Other drug; Rocephin [ceftriaxone sodium-lidocaine]; Sulfa drugs; Tape; and Tetanus toxoids    Current Outpatient Prescriptions Ordered in [facility]   Medication Sig Dispense Refill   • MAGNESIUM GLUCONATE PO Take  by mouth.     • citalopram (CELEXA) 40 MG Tab Take 40 mg by mouth every day.     • gabapentin (NEURONTIN) 400 MG Cap Take 400 mg by mouth every day.     • B Complex-C (B-COMPLEX WITH VITAMIN C) tablet Take 1 Tab by mouth every evening.     • Coenzyme Q10 (CO Q 10 PO) Take 1 Cap by mouth every evening.     • nabumetone (RELAFEN) 500 MG Tab Take 500 mg by mouth 2 times a day as needed for Moderate Pain.     • albuterol 108 (90 BASE) MCG/ACT Aero Soln inhalation aerosol Inhale 2 Puffs by mouth every 6 hours as needed for  Shortness of Breath. 8.5 g 0     No current Epic-ordered facility-administered medications on file.        Past Medical History:   Diagnosis Date   • Anemia    • Depression with anxiety    • Fibromyalgia    • Guillain-[place] (CMS-[facility]) 4//2015   • H/O bladder infections    • H/O infectious mononucleosis    • H/O meningitis    • H/O: pneumonia    • Migraines        Social History   Substance Use Topics   • Smoking status: Former Smoker     Quit date: 11/19/1989   • Smokeless tobacco: Never Used      Comment: avoid all tobacco products   • Alcohol use 0.0 oz/week      Comment: rarely       Family Status   Relation Status   • Mother Alive   • Father Alive    dementia   • Maternal Grandmother Alive   • Paternal Grandmother Alive     Family History   Problem Relation Age of Onset   • Heart Disease Mother    • Heart Disease Father    • Cancer Maternal Grandmother      colon    • Cancer Paternal Grandmother      ovarian       ROS:  Review of Systems   Constitutional: SEE HPI  HENT: SEE HPI  Eyes: Negative for blurred vision.   Respiratory: Negative for cough, sputum production, shortness of breath and wheezing.    Cardiovascular: Negative for chest pain, palpitations, orthopnea and leg swelling.   Gastrointestinal: Negative for heartburn, nausea, vomiting and abdominal pain.   All other systems reviewed and are negative.       Exam:  Blood pressure (!) 90/64, pulse 88, temperature 36.9 °C (98.4 °F), resp. rate 15, weight 89.4 kg (197 lb), SpO2 96 %.  General:  Well nourished, well developed female in NAD  Eyes: PERRLA, EOM within normal limits, no conjunctival injection, no scleral icterus, visual fields and acuity grossly intact.  Ears: Normal shape and symmetry, no tenderness, no discharge. External canals are without any significant edema or erythema. Tympanic membranes are without any inflammation, no effusion. Gross auditory acuity is intact  Nose: Symmetrical, sinuses without tenderness, clear rhinorrhea.   Mouth:  reasonable hygiene, no erythema exudates or tonsillar enlargement.  Neck: no masses, range of motion within normal limits, no tracheal deviation. No lymphadenopathy  Pulmonary: Normal respiratory effort, no wheezes, crackles, or rhonchi.  Cardiovascular: regular rate and rhythm without murmurs, rubs, or gallops.  Skin: No visible rashes or lesion. Warm, pink, dry.   Extremities: no clubbing, cyanosis, or edema.  Neuro: A&O x 3.  CN 2-12 grossly intact.  Motor strength upper and lower full and intact bilaterally.  Speech normal/clear.  Normal coordination. Normal gait.       Assessment/Plan:  1. Acute nonintractable headache, unspecified headache type  ketorolac (TORADOL) injection 60 mg       -no evidence of flu like symptoms or viral URI currently.   Patient vitals stable, exam benign.   -Toradol shot given in clinic, patient tolerated well.  Monitored for 10 mins s/p shot.   -headache pain red flags and ER precautions discussed with patient.     Supportive care, differential diagnoses, and indications for immediate follow-up discussed with patient.   Pathogenesis of diagnosis discussed including typical length and natural progression.   Instructed to return to clinic or nearest emergency department for any change in condition, further concerns, or worsening of symptoms.  Patient states understanding of the plan of care and discharge instructions.      Nathalia Yost P.A.-C.

## 2017-11-23 ENCOUNTER — HOSPITAL ENCOUNTER (EMERGENCY)
Facility: MEDICAL CENTER | Age: 52
End: 2017-11-23
Attending: EMERGENCY MEDICINE
Payer: COMMERCIAL

## 2017-11-23 ENCOUNTER — OFFICE VISIT (OUTPATIENT)
Dept: URGENT CARE | Facility: PHYSICIAN GROUP | Age: 52
End: 2017-11-23
Payer: COMMERCIAL

## 2017-11-23 ENCOUNTER — APPOINTMENT (OUTPATIENT)
Dept: RADIOLOGY | Facility: MEDICAL CENTER | Age: 52
End: 2017-11-23
Attending: EMERGENCY MEDICINE
Payer: COMMERCIAL

## 2017-11-23 VITALS
OXYGEN SATURATION: 92 % | HEIGHT: 66 IN | SYSTOLIC BLOOD PRESSURE: 97 MMHG | HEART RATE: 71 BPM | TEMPERATURE: 99.1 F | WEIGHT: 200.84 LBS | BODY MASS INDEX: 32.28 KG/M2 | DIASTOLIC BLOOD PRESSURE: 54 MMHG | RESPIRATION RATE: 16 BRPM

## 2017-11-23 VITALS
DIASTOLIC BLOOD PRESSURE: 60 MMHG | WEIGHT: 197 LBS | HEART RATE: 107 BPM | HEIGHT: 66 IN | SYSTOLIC BLOOD PRESSURE: 104 MMHG | BODY MASS INDEX: 31.66 KG/M2 | OXYGEN SATURATION: 96 % | TEMPERATURE: 99.3 F | RESPIRATION RATE: 18 BRPM

## 2017-11-23 DIAGNOSIS — B34.9 VIRAL SYNDROME: ICD-10-CM

## 2017-11-23 DIAGNOSIS — R50.9 FEVER, UNSPECIFIED FEVER CAUSE: ICD-10-CM

## 2017-11-23 DIAGNOSIS — G43.009 MIGRAINE WITHOUT AURA AND WITHOUT STATUS MIGRAINOSUS, NOT INTRACTABLE: ICD-10-CM

## 2017-11-23 DIAGNOSIS — R51.9 ACUTE INTRACTABLE HEADACHE, UNSPECIFIED HEADACHE TYPE: ICD-10-CM

## 2017-11-23 LAB
ANION GAP SERPL CALC-SCNC: 9 MMOL/L (ref 0–11.9)
APTT PPP: 26.3 SEC (ref 24.7–36)
BASOPHILS # BLD AUTO: 0.3 % (ref 0–1.8)
BASOPHILS # BLD: 0.04 K/UL (ref 0–0.12)
BUN SERPL-MCNC: 12 MG/DL (ref 8–22)
BURR CELLS/RBC NFR CSF MANUAL: 0 %
CALCIUM SERPL-MCNC: 9.1 MG/DL (ref 8.5–10.5)
CHLORIDE SERPL-SCNC: 99 MMOL/L (ref 96–112)
CLARITY CSF: CLEAR
CO2 SERPL-SCNC: 21 MMOL/L (ref 20–33)
COLOR CSF: COLORLESS
COLOR SPUN CSF: COLORLESS
CREAT SERPL-MCNC: 1.04 MG/DL (ref 0.5–1.4)
EOSINOPHIL # BLD AUTO: 0 K/UL (ref 0–0.51)
EOSINOPHIL NFR BLD: 0 % (ref 0–6.9)
ERYTHROCYTE [DISTWIDTH] IN BLOOD BY AUTOMATED COUNT: 42.1 FL (ref 35.9–50)
FLUAV RNA SPEC QL NAA+PROBE: NEGATIVE
FLUBV RNA SPEC QL NAA+PROBE: NEGATIVE
GFR SERPL CREATININE-BSD FRML MDRD: 56 ML/MIN/1.73 M 2
GLUCOSE CSF-MCNC: 61 MG/DL (ref 40–80)
GLUCOSE SERPL-MCNC: 105 MG/DL (ref 65–99)
GRAM STN SPEC: NORMAL
HCT VFR BLD AUTO: 41.1 % (ref 37–47)
HGB BLD-MCNC: 14.2 G/DL (ref 12–16)
IMM GRANULOCYTES # BLD AUTO: 0.03 K/UL (ref 0–0.11)
IMM GRANULOCYTES NFR BLD AUTO: 0.3 % (ref 0–0.9)
INR PPP: 1.01 (ref 0.87–1.13)
LACTATE BLD-SCNC: 1.5 MMOL/L (ref 0.5–2)
LYMPHOCYTES # BLD AUTO: 0.78 K/UL (ref 1–4.8)
LYMPHOCYTES NFR BLD: 6.8 % (ref 22–41)
LYMPHOCYTES NFR CSF: 65 %
MCH RBC QN AUTO: 32.3 PG (ref 27–33)
MCHC RBC AUTO-ENTMCNC: 34.5 G/DL (ref 33.6–35)
MCV RBC AUTO: 93.4 FL (ref 81.4–97.8)
MONOCYTES # BLD AUTO: 1.05 K/UL (ref 0–0.85)
MONOCYTES NFR BLD AUTO: 9.2 % (ref 0–13.4)
MONONUC CELLS NFR CSF: 30 %
NEUTROPHILS # BLD AUTO: 9.57 K/UL (ref 2–7.15)
NEUTROPHILS NFR BLD: 83.4 % (ref 44–72)
NEUTROPHILS NFR CSF: 5 %
NRBC # BLD AUTO: 0 K/UL
NRBC BLD AUTO-RTO: 0 /100 WBC
PLATELET # BLD AUTO: 242 K/UL (ref 164–446)
PMV BLD AUTO: 10.7 FL (ref 9–12.9)
POTASSIUM SERPL-SCNC: 4 MMOL/L (ref 3.6–5.5)
PROT CSF-MCNC: 24 MG/DL (ref 15–45)
PROTHROMBIN TIME: 13 SEC (ref 12–14.6)
RBC # BLD AUTO: 4.4 M/UL (ref 4.2–5.4)
RBC # CSF: 7 CELLS/UL
SIGNIFICANT IND 70042: NORMAL
SITE SITE: NORMAL
SODIUM SERPL-SCNC: 129 MMOL/L (ref 135–145)
SOURCE SOURCE: NORMAL
SPECIMEN VOL CSF: 10.5 ML
TUBE # CSF: 3
TUBE # CSF: 3
WBC # BLD AUTO: 11.5 K/UL (ref 4.8–10.8)
WBC # CSF: 1 CELLS/UL (ref 0–10)

## 2017-11-23 PROCEDURE — 96374 THER/PROPH/DIAG INJ IV PUSH: CPT

## 2017-11-23 PROCEDURE — 87205 SMEAR GRAM STAIN: CPT

## 2017-11-23 PROCEDURE — 85610 PROTHROMBIN TIME: CPT

## 2017-11-23 PROCEDURE — 87502 INFLUENZA DNA AMP PROBE: CPT

## 2017-11-23 PROCEDURE — 96375 TX/PRO/DX INJ NEW DRUG ADDON: CPT

## 2017-11-23 PROCEDURE — 62270 DX LMBR SPI PNXR: CPT

## 2017-11-23 PROCEDURE — 96376 TX/PRO/DX INJ SAME DRUG ADON: CPT

## 2017-11-23 PROCEDURE — 85025 COMPLETE CBC W/AUTO DIFF WBC: CPT

## 2017-11-23 PROCEDURE — 82945 GLUCOSE OTHER FLUID: CPT

## 2017-11-23 PROCEDURE — 87015 SPECIMEN INFECT AGNT CONCNTJ: CPT

## 2017-11-23 PROCEDURE — 87070 CULTURE OTHR SPECIMN AEROBIC: CPT

## 2017-11-23 PROCEDURE — 700105 HCHG RX REV CODE 258: Performed by: EMERGENCY MEDICINE

## 2017-11-23 PROCEDURE — 85730 THROMBOPLASTIN TIME PARTIAL: CPT

## 2017-11-23 PROCEDURE — 87040 BLOOD CULTURE FOR BACTERIA: CPT

## 2017-11-23 PROCEDURE — 83605 ASSAY OF LACTIC ACID: CPT

## 2017-11-23 PROCEDURE — 99214 OFFICE O/P EST MOD 30 MIN: CPT | Performed by: NURSE PRACTITIONER

## 2017-11-23 PROCEDURE — 89051 BODY FLUID CELL COUNT: CPT

## 2017-11-23 PROCEDURE — 700111 HCHG RX REV CODE 636 W/ 250 OVERRIDE (IP): Performed by: EMERGENCY MEDICINE

## 2017-11-23 PROCEDURE — 84157 ASSAY OF PROTEIN OTHER: CPT

## 2017-11-23 PROCEDURE — 99285 EMERGENCY DEPT VISIT HI MDM: CPT

## 2017-11-23 PROCEDURE — 80048 BASIC METABOLIC PNL TOTAL CA: CPT

## 2017-11-23 RX ORDER — ONDANSETRON 2 MG/ML
4 INJECTION INTRAMUSCULAR; INTRAVENOUS ONCE
Status: COMPLETED | OUTPATIENT
Start: 2017-11-23 | End: 2017-11-23

## 2017-11-23 RX ORDER — LIDOCAINE HYDROCHLORIDE 10 MG/ML
20 INJECTION, SOLUTION INFILTRATION; PERINEURAL ONCE
Status: DISCONTINUED | OUTPATIENT
Start: 2017-11-23 | End: 2017-11-23 | Stop reason: HOSPADM

## 2017-11-23 RX ORDER — HYDROMORPHONE HYDROCHLORIDE 2 MG/ML
0.5 INJECTION, SOLUTION INTRAMUSCULAR; INTRAVENOUS; SUBCUTANEOUS ONCE
Status: COMPLETED | OUTPATIENT
Start: 2017-11-23 | End: 2017-11-23

## 2017-11-23 RX ORDER — HYDROMORPHONE HYDROCHLORIDE 2 MG/ML
1 INJECTION, SOLUTION INTRAMUSCULAR; INTRAVENOUS; SUBCUTANEOUS ONCE
Status: COMPLETED | OUTPATIENT
Start: 2017-11-23 | End: 2017-11-23

## 2017-11-23 RX ORDER — CEFTRIAXONE 2 G/1
2 INJECTION, POWDER, FOR SOLUTION INTRAMUSCULAR; INTRAVENOUS ONCE
Status: COMPLETED | OUTPATIENT
Start: 2017-11-23 | End: 2017-11-23

## 2017-11-23 RX ORDER — SODIUM CHLORIDE 9 MG/ML
1000 INJECTION, SOLUTION INTRAVENOUS ONCE
Status: COMPLETED | OUTPATIENT
Start: 2017-11-23 | End: 2017-11-23

## 2017-11-23 RX ADMIN — HYDROMORPHONE HYDROCHLORIDE 0.5 MG: 2 INJECTION INTRAMUSCULAR; INTRAVENOUS; SUBCUTANEOUS at 12:55

## 2017-11-23 RX ADMIN — CEFTRIAXONE 2 G: 2 INJECTION, POWDER, FOR SOLUTION INTRAMUSCULAR; INTRAVENOUS at 13:28

## 2017-11-23 RX ADMIN — HYDROMORPHONE HYDROCHLORIDE 1 MG: 2 INJECTION INTRAMUSCULAR; INTRAVENOUS; SUBCUTANEOUS at 11:38

## 2017-11-23 RX ADMIN — HYDROMORPHONE HYDROCHLORIDE 0.5 MG: 2 INJECTION INTRAMUSCULAR; INTRAVENOUS; SUBCUTANEOUS at 15:15

## 2017-11-23 RX ADMIN — SODIUM CHLORIDE 1000 ML: 9 INJECTION, SOLUTION INTRAVENOUS at 11:38

## 2017-11-23 RX ADMIN — ONDANSETRON 4 MG: 2 INJECTION INTRAMUSCULAR; INTRAVENOUS at 11:38

## 2017-11-23 ASSESSMENT — ENCOUNTER SYMPTOMS
SCALP TENDERNESS: 1
NECK PAIN: 1
VOMITING: 0
MIGRAINE HEADACHES: 1
MYALGIAS: 0
SHORTNESS OF BREATH: 0
CHILLS: 1
FEVER: 1
BLURRED VISION: 1
EYE PAIN: 0
HEADACHES: 1
SORE THROAT: 0
DIZZINESS: 0
ANOREXIA: 0
PHOTOPHOBIA: 1
VISUAL CHANGE: 0
NAUSEA: 0

## 2017-11-23 ASSESSMENT — PAIN SCALES - GENERAL: PAINLEVEL_OUTOF10: 8

## 2017-11-23 NOTE — DISCHARGE INSTRUCTIONS
"Migraine Headache  A migraine headache is very bad, throbbing pain on one or both sides of your head. Talk to your doctor about what things may bring on (trigger) your migraine headaches.  HOME CARE  · Only take medicines as told by your doctor.  · Lie down in a dark, quiet room when you have a migraine.  · Keep a journal to find out if certain things bring on migraine headaches. For example, write down:  ¨ What you eat and drink.  ¨ How much sleep you get.  ¨ Any change to your diet or medicines.  · Lessen how much alcohol you drink.  · Quit smoking if you smoke.  · Get enough sleep.  · Lessen any stress in your life.  · Keep lights dim if bright lights bother you or make your migraines worse.  GET HELP RIGHT AWAY IF:   · Your migraine becomes really bad.  · You have a fever.  · You have a stiff neck.  · You have trouble seeing.  · Your muscles are weak, or you lose muscle control.  · You lose your balance or have trouble walking.  · You feel like you will pass out (faint), or you pass out.  · You have really bad symptoms that are different than your first symptoms.  MAKE SURE YOU:   · Understand these instructions.  · Will watch your condition.  · Will get help right away if you are not doing well or get worse.     This information is not intended to replace advice given to you by your health care provider. Make sure you discuss any questions you have with your health care provider.     Document Released: 09/26/2009 Document Revised: 03/11/2013 Document Reviewed: 08/25/2014  [employer] Patient Education ©2016 [employer]    Viral Syndrome  You or your child has Viral Syndrome. It is the most common infection causing \"colds\" and infections in the nose, throat, sinuses, and breathing tubes. Sometimes the infection causes nausea, vomiting, or diarrhea. The germ that causes the infection is a virus. No antibiotic or other medicine will kill it. There are medicines that you can take to make you or your " child more comfortable.   HOME CARE INSTRUCTIONS   · Rest in bed until you start to feel better.   · If you have diarrhea or vomiting, eat small amounts of crackers and toast. Soup is helpful.   · Do not give aspirin or medicine that contains aspirin to children.   · Only take over-the-counter or prescription medicines for pain, discomfort, or fever as directed by your caregiver.   SEEK IMMEDIATE MEDICAL CARE IF:   · You or your child has not improved within one week.   · You or your child has pain that is not at least partially relieved by over-the-counter medicine.   · Thick, colored mucus or blood is coughed up.   · Discharge from the nose becomes thick yellow or green.   · Diarrhea or vomiting gets worse.   · There is any major change in your or your child's condition.   · You or your child develops a skin rash, stiff neck, severe headache, or are unable to hold down food or fluid.   · You or your child has an oral temperature above 102° F (38.9° C), not controlled by medicine.   · Your baby is older than 3 months with a rectal temperature of 102° F (38.9° C) or higher.   · Your baby is 3 months old or younger with a rectal temperature of 100.4° F (38° C) or higher.   Document Released: 12/03/2007 Document Revised: 03/11/2013 Document Reviewed: 12/03/2008  [employer]® Patient Information ©2013 [employer].

## 2017-11-23 NOTE — ED NOTES
"Chief Complaint   Patient presents with   • Migraine     x 5days   • Fever   • Sweats   • Chills   BP (!) 97/54   Pulse 98   Temp (!) 38.7 °C (101.7 °F)   Resp 16   Ht 1.676 m (5' 6\")   Wt 91.1 kg (200 lb 13.4 oz)   SpO2 97%   BMI 32.42 kg/m²    Pt ambulatory into triage for the above complaints. States the fever/chills/sweats/shakes have been intermittent. States she was seen at  yesterday and got a shot of Toradol for the migraine, states the Toradol helped a little, but now her migraine is back. States she went back to  today and was sent to the ER for possible viral Meningitis work-up. States difficulty ambulating, c/o photosensitivity. States hx of Guillan-[place]. Charge RN notified of pt arrival, pt roomed to room 7. VS monitored.   "

## 2017-11-23 NOTE — ED PROVIDER NOTES
ED Provider Note    Scribed for Selvin Eugene M.D. by Kirsten Lindquist. 11/23/2017  11:26 AM    Primary care provider: Meera Bonds D.O.  Means of arrival: walk in   History obtained from: patient   History limited by: none     CHIEF COMPLAINT  Chief Complaint   Patient presents with   • Migraine     x 5days   • Fever   • Sweats   • Chills       HPI  Keiko Burgos is a 52 y.o. female who presents to the Emergency Department for evaluation of a migraine onset 5 days ago. She confirms having a history of migraines but reports her current migraine is more severe than her typical headache. She has associated fever and shaking chills. Patient was seen in an urgent care yesterday and was treated with a shot of Toradol. She returned to the urgent care today for the same complaint. Patient was referred to the ED for a meningitis rule out. She denies cough and runny nose . Positive diarrhea. She has had one episode of vomiting.      REVIEW OF SYSTEMS  Pertinent positives include migraine, fever, shaking chills, mild dysuria.   Pertinent negatives include no cough, rhinorrhea.    All other systems reviewed and negative. C.       PAST MEDICAL HISTORY   has a past medical history of Anemia; Depression with anxiety; Fibromyalgia; Guillain-[place] (CMS-[facility]) (4//2015); H/O bladder infections; H/O infectious mononucleosis; H/O meningitis; H/O: pneumonia; and Migraines.      SURGICAL HISTORY   has a past surgical history that includes lumbar decompression; abdominal hysterectomy total; and oophorectomy.      SOCIAL HISTORY  Social History   Substance Use Topics   • Smoking status: Former Smoker     Quit date: 11/19/1989   • Smokeless tobacco: Never Used      Comment: avoid all tobacco products   • Alcohol use 0.0 oz/week      Comment: rarely      History   Drug Use   • Types: Marijuana     Comment: every day to sleep        FAMILY HISTORY  Family History   Problem Relation Age of Onset   • Heart Disease Mother    •  "Heart Disease Father    • Cancer Maternal Grandmother      colon    • Cancer Paternal Grandmother      ovarian       CURRENT MEDICATIONS  Home Medications    **Home medications have not yet been reviewed for this encounter**         ALLERGIES  Allergies   Allergen Reactions   • Influenza Vaccines Unspecified     Hx of Guillian [place]   • Keflex Unspecified     Hx of Guillian [place]   • Other Drug Unspecified     Pneumonia vaccine. Hx of Guillian [place]   • Rocephin [Ceftriaxone Sodium-Lidocaine] Unspecified     PT WILL NOT TAKE MEDICATION. NOT ALLERGIC.   • Sulfa Drugs Unspecified     Hx of Guillian [place]   • Tape Unspecified     Blister. Paper tape ok per patient   • Tetanus Toxoids Unspecified     Hx of Guillian [place]       PHYSICAL EXAM  VITAL SIGNS: BP (!) 97/54   Pulse 98   Temp (!) 38.7 °C (101.7 °F)   Resp 16   Ht 1.676 m (5' 6\")   Wt 91.1 kg (200 lb 13.4 oz)   SpO2 97%   BMI 32.42 kg/m²   Vital signs reviewed.  Constitutional:  Appears well-developed and well-nourished.  Head: Normocephalic.   Neck: Neck is slightly stiff.   Cardiovascular: Regular rate and rhythm.   Pulmonary/Chest: Clear to ausculation.   Abdominal: Soft. There is no tenderness.  Lymphadenopathy: No cervical adenopathy.   Neurological: Patient is alert and oriented to person, place, and time. Normal sensation and strength.  Skin: tactile fever, dry and flushed. No rash.   Psychiatric: Patient has a normal mood and affect. Behavior is normal.         LABS  Results for orders placed or performed during the hospital encounter of 11/23/17   CBC WITH DIFFERENTIAL   Result Value Ref Range    WBC 11.5 (H) 4.8 - 10.8 K/uL    RBC 4.40 4.20 - 5.40 M/uL    Hemoglobin 14.2 12.0 - 16.0 g/dL    Hematocrit 41.1 37.0 - 47.0 %    MCV 93.4 81.4 - 97.8 fL    MCH 32.3 27.0 - 33.0 pg    MCHC 34.5 33.6 - 35.0 g/dL    RDW 42.1 35.9 - 50.0 fL    Platelet Count 242 164 - 446 K/uL    MPV 10.7 9.0 - 12.9 fL    Neutrophils-Polys 83.40 (H) 44.00 - 72.00 %    " Lymphocytes 6.80 (L) 22.00 - 41.00 %    Monocytes 9.20 0.00 - 13.40 %    Eosinophils 0.00 0.00 - 6.90 %    Basophils 0.30 0.00 - 1.80 %    Immature Granulocytes 0.30 0.00 - 0.90 %    Nucleated RBC 0.00 /100 WBC    Neutrophils (Absolute) 9.57 (H) 2.00 - 7.15 K/uL    Lymphs (Absolute) 0.78 (L) 1.00 - 4.80 K/uL    Monos (Absolute) 1.05 (H) 0.00 - 0.85 K/uL    Eos (Absolute) 0.00 0.00 - 0.51 K/uL    Baso (Absolute) 0.04 0.00 - 0.12 K/uL    Immature Granulocytes (abs) 0.03 0.00 - 0.11 K/uL    NRBC (Absolute) 0.00 K/uL   BASIC METABOLIC PANEL   Result Value Ref Range    Sodium 129 (L) 135 - 145 mmol/L    Potassium 4.0 3.6 - 5.5 mmol/L    Chloride 99 96 - 112 mmol/L    Co2 21 20 - 33 mmol/L    Glucose 105 (H) 65 - 99 mg/dL    Bun 12 8 - 22 mg/dL    Creatinine 1.04 0.50 - 1.40 mg/dL    Calcium 9.1 8.5 - 10.5 mg/dL    Anion Gap 9.0 0.0 - 11.9   CSF PROTEIN   Result Value Ref Range    Total Protein, CSF 24 15 - 45 mg/dL   CSF GLUCOSE   Result Value Ref Range    Glucose CSF 61 40 - 80 mg/dL   CSF CELL COUNT   Result Value Ref Range    Number Of Tubes 3     Volume 10.5 mL    Color-Body Fluid Colorless     Character-Body Fluid Clear     Supernatant Appearance Colorless     Total RBC Count 7 cells/uL    Crenated RBC 0 %    Total WBC Count 1 0 - 10 cells/uL    Polys 5 %    Lymphs 65 %    Mononuclear Cells - CSF 30 %    CSF Tube Number 3    PROTHROMBIN TIME   Result Value Ref Range    PT 13.0 12.0 - 14.6 sec    INR 1.01 0.87 - 1.13   APTT   Result Value Ref Range    APTT 26.3 24.7 - 36.0 sec   LACTIC ACID   Result Value Ref Range    Lactic Acid 1.5 0.5 - 2.0 mmol/L   ESTIMATED GFR   Result Value Ref Range    GFR If African American >60 >60 mL/min/1.73 m 2    GFR If Non  56 (A) >60 mL/min/1.73 m 2   GRAM STAIN   Result Value Ref Range    Significant Indicator .     Source CSF     Site TAP     Gram Stain Result No organisms seen.    All labs reviewed by me.      RADIOLOGY  DX-LUMBAR PUNCTURE FOR DIAGNOSIS   Final  Result      FLUOROSCOPIC-GUIDED LUMBAR PUNCTURE AS DESCRIBED ABOVE.      The radiologist's interpretation of all radiological studies have been reviewed by me.        Lumbar Puncture Procedure Note  Indication: Suspected meningitis  Consent: The patient was counseled regarding the procedure, it's indications, risks, potential complications and alternatives and any questions were answered. Consent was obtained.  Procedure: The patient was placed in the sitting, supported by bed side stand, position and the appropriate landmarks were identified. The area was prepped and draped in the usual sterile fashion. Anesthesia was obtained using 4 cc of 1% Lidocaine without epinephrine. A spinal needle was inserted at the L3- L4 level with the stylet in place until spinal fluid was returned. Opening pressure was not measured. At this point no fluid was able to be obtained and the patient was sent for a fluoroscopic guided lumbar puncture. The stylet was then replaced and the needle was withdrawn. A sterile dressing was placed over the site and the patient was placed in the supine position.  The patient tolerated the procedure well.  Complications: None        COURSE & MEDICAL DECISION MAKING  Pertinent Labs & Imaging studies reviewed. (See chart for details) The patient's [facility] and past medical  records were reviewed    11:26 AM - Patient seen and examined at bedside. Patient will be treated with IV fluids secondary to sepsis protocol, dilaudid 1 mg and Zofran 4 mg. Ordered DX lumbar, blood culture, lactic acid, CBC, BMP, urinalysis, CSF culture, CSF protein, CSF glucose, CSF cell count, PTT and APTT to evaluate her symptoms.     11:58 AM Patient reevaluated at bedside. Discussed plan of care with the patient. She agrees and consents to lumbar puncture procedure.     12:48 PM Performed lumbar procedure at bedside. See above note for details.     1:03 PM Paged radiology after unsuccessful attempt at lumbar puncture who  report they will perform the LP later today. LP was performed. There is no sign of meningitis. I suspect she has a viral syndrome which is bringing on her migraines. At this point I do not think antibiotics are indicated. Patient was reassured and discharged home in stable condition            FINAL IMPRESSION  1. Migraine without aura and without status migrainosus, not intractable    2. Viral syndrome           Kirsten PEREYRA (Derrickibe), am scribing for, and in the presence of, Selvin Eugene M.D..  Electronically signed by: Kirsten Lindquist (Scribe), 11/23/2017  I, Selvin Eugene M.D. personally performed the services described in this documentation, as scribed by Kirsten Lindquist in my presence, and it is both accurate and complete.    The note accurately reflects work and decisions made by me.  Selvin Eugene  11/23/2017  3:11 PM

## 2017-11-23 NOTE — PROGRESS NOTES
"Subjective:   Keiko Alejo a 52 y.o. female who presents for Migraine (x 2 days was seen in  11/22/17 feeling worse )        Headache    This is a new problem. The current episode started yesterday. The problem occurs constantly. The problem has been unchanged. The pain is located in the left unilateral region. The pain radiates to the face. The pain quality is not similar to prior headaches. The quality of the pain is described as sharp, aching and shooting. The pain is at a severity of 10/10. The pain is severe. Associated symptoms include blurred vision, ear pain, a fever, neck pain, phonophobia, photophobia and scalp tenderness. Pertinent negatives include no anorexia, dizziness, eye pain, nausea, sore throat, visual change or vomiting. The symptoms are aggravated by unknown. She has tried NSAIDs for the symptoms. The treatment provided no relief. Her past medical history is significant for migraine headaches.   Patient's history includes Guyon bar[place]. Patient has a history of migraines but states \"this is worse than any she has ever had\".    Review of Systems   Constitutional: Positive for chills, fever and malaise/fatigue.   HENT: Positive for ear pain. Negative for sore throat.    Eyes: Positive for blurred vision and photophobia. Negative for pain.   Respiratory: Negative for shortness of breath.    Cardiovascular: Negative for chest pain.   Gastrointestinal: Negative for anorexia, nausea and vomiting.   Genitourinary: Negative for hematuria.   Musculoskeletal: Positive for neck pain. Negative for myalgias.   Skin: Negative for rash.   Neurological: Positive for headaches. Negative for dizziness.     Allergies   Allergen Reactions   • Influenza Vaccines Unspecified     Hx of Guillian [place]   • Keflex Unspecified     Hx of Guillian [place]   • Other Drug Unspecified     Pneumonia vaccine. Hx of Guillian [place]   • Rocephin [Ceftriaxone Sodium-Lidocaine] Unspecified     PT WILL NOT TAKE MEDICATION. NOT " "ALLERGIC.   • Sulfa Drugs Unspecified     Hx of Guillian [place]   • Tape Unspecified     Blister. Paper tape ok per patient   • Tetanus Toxoids Unspecified     Hx of Guillian [place]      Objective:   /60   Pulse (!) 107   Temp 37.4 °C (99.3 °F)   Resp 18   Ht 1.676 m (5' 6\")   Wt 89.4 kg (197 lb)   SpO2 96%   BMI 31.80 kg/m²   Physical Exam   Constitutional: She is oriented to person, place, and time. She appears well-developed and well-nourished. She has a sickly appearance. No distress.   HENT:   Head: Normocephalic and atraumatic.   Right Ear: Tympanic membrane normal.   Left Ear: Tympanic membrane normal.   Nose: Nose normal. Right sinus exhibits no maxillary sinus tenderness and no frontal sinus tenderness. Left sinus exhibits no maxillary sinus tenderness and no frontal sinus tenderness.   Mouth/Throat: Uvula is midline, oropharynx is clear and moist and mucous membranes are normal. No posterior oropharyngeal edema, posterior oropharyngeal erythema or tonsillar abscesses. No tonsillar exudate.   Eyes: Conjunctivae, EOM and lids are normal. Pupils are equal, round, and reactive to light. Right eye exhibits no discharge. Left eye exhibits no discharge. Right eye exhibits normal extraocular motion and no nystagmus. Left eye exhibits normal extraocular motion and no nystagmus. Right pupil is round and reactive. Left pupil is round and reactive. Pupils are equal.   Neck: No Kernig's sign noted.   Patient with moderate stiff neck, patient unable to bring legs up without pain.   Cardiovascular: Normal rate and regular rhythm.    No murmur heard.  Pulmonary/Chest: Effort normal and breath sounds normal. No respiratory distress.   Abdominal: Soft. She exhibits no distension. There is no tenderness.   Neurological: She is alert and oriented to person, place, and time. She has normal strength and normal reflexes. No cranial nerve deficit or sensory deficit. She displays a negative Romberg sign. GCS eye " subscore is 4. GCS verbal subscore is 5. GCS motor subscore is 6.   Skin: Skin is warm, dry and intact.   Psychiatric: She has a normal mood and affect.         Assessment/Plan:   Assessment    1. Fever, unspecified fever cause    2. Acute intractable headache, unspecified headache type    Patient presents today with increasing headache pain, fevers, chills. Patient was in yesterday for migraine and received a Toradol injection that provided minimal relief. As of last night she started having fevers, chills. In clinic she appears sickly. Neuro exam unremarkable. Differential includes possible meningitis. Patient advised that she needs to be seen in the ER for further evaluation and diagnostics. Patient verbalizing understanding requesting to go via personal vehicle. Patient's brother-in-law willing to drive patient to ER.     Report was called to Dr. Pride ER physician.     Differential diagnosis, natural history, supportive care, and indications for immediate follow-up discussed.

## 2017-11-23 NOTE — ED NOTES
Pt being dc'd home, came back to [facility] to request a prescription for pain medication , no prescriptions written by ERP, also explained to pt that she was in pain management and would have to follow up with her pain management doctor or PMD

## 2017-11-23 NOTE — OR SURGEON
Immediate Post- Operative Note        PostOp Diagnosis: headache      Procedure(s): lumbar puncture      Estimated Blood Loss: Less than 5 ml        Complications: None            11/23/2017     2:20 PM     Juliano Maria

## 2017-11-26 ENCOUNTER — HOSPITAL ENCOUNTER (OUTPATIENT)
Dept: RADIOLOGY | Facility: MEDICAL CENTER | Age: 52
End: 2017-11-26
Attending: PHYSICIAN ASSISTANT
Payer: COMMERCIAL

## 2017-11-26 ENCOUNTER — OFFICE VISIT (OUTPATIENT)
Dept: URGENT CARE | Facility: PHYSICIAN GROUP | Age: 52
End: 2017-11-26
Payer: COMMERCIAL

## 2017-11-26 VITALS
WEIGHT: 197 LBS | HEART RATE: 75 BPM | HEIGHT: 66 IN | OXYGEN SATURATION: 99 % | BODY MASS INDEX: 31.66 KG/M2 | DIASTOLIC BLOOD PRESSURE: 62 MMHG | SYSTOLIC BLOOD PRESSURE: 100 MMHG | RESPIRATION RATE: 16 BRPM | TEMPERATURE: 97.8 F

## 2017-11-26 DIAGNOSIS — R51.9 INTRACTABLE HEADACHE, UNSPECIFIED CHRONICITY PATTERN, UNSPECIFIED HEADACHE TYPE: ICD-10-CM

## 2017-11-26 LAB
BACTERIA CSF CULT: NORMAL
GRAM STN SPEC: NORMAL
SIGNIFICANT IND 70042: NORMAL
SITE SITE: NORMAL
SOURCE SOURCE: NORMAL

## 2017-11-26 PROCEDURE — 99214 OFFICE O/P EST MOD 30 MIN: CPT | Performed by: PHYSICIAN ASSISTANT

## 2017-11-26 PROCEDURE — 70450 CT HEAD/BRAIN W/O DYE: CPT

## 2017-11-26 RX ORDER — DIAZEPAM 5 MG/1
5 TABLET ORAL EVERY 6 HOURS PRN
Qty: 10 TAB | Refills: 0 | Status: SHIPPED | OUTPATIENT
Start: 2017-11-26 | End: 2018-04-10

## 2017-11-26 RX ORDER — BUTALBITAL, ACETAMINOPHEN, CAFFEINE AND CODEINE PHOSPHATE 50; 325; 40; 30 MG/1; MG/1; MG/1; MG/1
1 CAPSULE ORAL EVERY 4 HOURS PRN
Qty: 30 CAP | Refills: 0 | Status: SHIPPED | OUTPATIENT
Start: 2017-11-26 | End: 2018-04-10

## 2017-11-28 LAB
BACTERIA BLD CULT: NORMAL
BACTERIA BLD CULT: NORMAL
SIGNIFICANT IND 70042: NORMAL
SIGNIFICANT IND 70042: NORMAL
SITE SITE: NORMAL
SITE SITE: NORMAL
SOURCE SOURCE: NORMAL
SOURCE SOURCE: NORMAL

## 2017-11-30 ASSESSMENT — ENCOUNTER SYMPTOMS
SPEECH CHANGE: 0
ABDOMINAL PAIN: 0
ANOREXIA: 1
SENSORY CHANGE: 0
SINUS PRESSURE: 1
MIGRAINE HEADACHES: 1
PHOTOPHOBIA: 1
CHILLS: 1
TINGLING: 0
FEVER: 0
FOCAL WEAKNESS: 0
SEIZURES: 0
NAUSEA: 1
VISUAL CHANGE: 0
HEADACHES: 1
VOMITING: 1
BLURRED VISION: 0

## 2017-12-01 NOTE — PROGRESS NOTES
Subjective:      Keiko Burgos is a 52 y.o. female who presents with Migraine (C/o migraine, cold/hot flashes, nausea, slight sensitivity to light. Pain feels like it's right in the middle of the forehead.  PT states it's gotten a little better since she was last seen, but currently having a spike in pain.)    PMH:  has a past medical history of Anemia; Depression with anxiety; Fibromyalgia; Guillain-[place] (CMS-[facility]) (4//2015); H/O bladder infections; H/O infectious mononucleosis; H/O meningitis; H/O: pneumonia; and Migraines.  MEDS:   Current Outpatient Prescriptions:   •  butalbital-acetaminophen-caffeine-codeine (FIORICET W/CODEINE) -61-30 MG per capsule, Take 1 Cap by mouth every four hours as needed for Headache., Disp: 30 Cap, Rfl: 0  •  diazepam (VALIUM) 5 MG Tab, Take 1 Tab by mouth every 6 hours as needed for Anxiety or Sleep., Disp: 10 Tab, Rfl: 0  •  MAGNESIUM GLUCONATE PO, Take  by mouth., Disp: , Rfl:   •  citalopram (CELEXA) 40 MG Tab, Take 40 mg by mouth every day., Disp: , Rfl:   •  gabapentin (NEURONTIN) 400 MG Cap, Take 400 mg by mouth every day., Disp: , Rfl:   •  albuterol 108 (90 BASE) MCG/ACT Aero Soln inhalation aerosol, Inhale 2 Puffs by mouth every 6 hours as needed for Shortness of Breath., Disp: 8.5 g, Rfl: 0  •  B Complex-C (B-COMPLEX WITH VITAMIN C) tablet, Take 1 Tab by mouth every evening., Disp: , Rfl:   •  Coenzyme Q10 (CO Q 10 PO), Take 1 Cap by mouth every evening., Disp: , Rfl:   •  nabumetone (RELAFEN) 500 MG Tab, Take 500 mg by mouth 2 times a day as needed for Moderate Pain., Disp: , Rfl:   ALLERGIES:   Allergies   Allergen Reactions   • Influenza Vaccines Unspecified     Hx of Guillian [place]   • Keflex Unspecified     Hx of Guillian [place]   • Other Drug Unspecified     Pneumonia vaccine. Hx of Guillian [place]   • Rocephin [Ceftriaxone Sodium-Lidocaine] Unspecified     PT WILL NOT TAKE MEDICATION. NOT ALLERGIC.   • Sulfa Drugs Unspecified     Hx of Guillian [place]   •  Tape Unspecified     Blister. Paper tape ok per patient   • Tetanus Toxoids Unspecified     Hx of Guillian [place]     SURGHX:   Past Surgical History:   Procedure Laterality Date   • ABDOMINAL HYSTERECTOMY TOTAL      fibroid   • LUMBAR DECOMPRESSION     • OOPHORECTOMY      rt side only removed     SOCHX:  reports that she quit smoking about 28 years ago. She has never used smokeless tobacco. She reports that she drinks alcohol. She reports that she uses drugs, including Marijuana.  FH: family history includes Cancer in her maternal grandmother and paternal grandmother; Heart Disease in her father and mother.  Reviewed with patient/family. Not pertinent to this complaint.            PT presents today with complaint of unimproved severe headache.  This is the patient's 4th visit in 5 days for same complaint. PT was seen 3 days ago in ER, had negative meningitis work up including LP, neg flu, neg .  PT states headache just wont go away.  PT has tried otc medications without relief.       Headache    This is a new problem. The current episode started in the past 7 days. The problem occurs constantly. The pain is located in the bilateral and frontal region. The pain does not radiate. The pain quality is not similar to prior headaches. The quality of the pain is described as aching, boring, sharp, stabbing and throbbing. The pain is at a severity of 9/10. Associated symptoms include anorexia, nausea, photophobia, sinus pressure and vomiting. Pertinent negatives include no abdominal pain, blurred vision, fever, seizures, tingling or visual change. The symptoms are aggravated by activity, bright light, emotional stress and work. She has tried acetaminophen, antidepressants, NSAIDs and triptans for the symptoms. The treatment provided no relief. Her past medical history is significant for migraine headaches and sinus disease. There is no history of cancer, hypertension or recent head traumas.       Review of Systems  "  Constitutional: Positive for chills. Negative for fever.   HENT: Positive for sinus pressure.    Eyes: Positive for photophobia. Negative for blurred vision.   Gastrointestinal: Positive for anorexia, nausea and vomiting. Negative for abdominal pain.   Neurological: Positive for headaches. Negative for tingling, sensory change, speech change, focal weakness and seizures.   All other systems reviewed and are negative.         Objective:     /62   Pulse 75   Temp 36.6 °C (97.8 °F)   Resp 16   Ht 1.676 m (5' 6\")   Wt 89.4 kg (197 lb)   SpO2 99%   BMI 31.80 kg/m²      Physical Exam   Constitutional: She is oriented to person, place, and time. She appears well-developed and well-nourished. She appears distressed (tearful).   HENT:   Head: Normocephalic and atraumatic.   Right Ear: Tympanic membrane normal.   Left Ear: Tympanic membrane normal.   Nose: Rhinorrhea present.   Mouth/Throat: Uvula is midline, oropharynx is clear and moist and mucous membranes are normal.   Eyes: Conjunctivae, EOM and lids are normal. Pupils are equal, round, and reactive to light.   Neck: Full passive range of motion without pain. Neck supple. No neck rigidity. No Brudzinski's sign and no Kernig's sign noted.   Cardiovascular: Normal rate, regular rhythm, normal heart sounds and intact distal pulses.    Pulmonary/Chest: Effort normal and breath sounds normal.   Musculoskeletal: Normal range of motion.   Neurological: She is alert and oriented to person, place, and time. She has normal strength. No cranial nerve deficit or sensory deficit. She exhibits normal muscle tone. She displays a negative Romberg sign. Coordination and gait normal. GCS eye subscore is 4. GCS verbal subscore is 5. GCS motor subscore is 6.   Skin: Skin is warm and dry.   Psychiatric: She has a normal mood and affect.   Nursing note and vitals reviewed.       CT images reviewed by Blaine doyle read below:     Impression         1. No acute intracranial " abnormality. No evidence of acute intracranial hemorrhage or mass lesion.       Reading Provider Reading Date   Tariq Spicer M.D. Nov 26, 2017   Signing Provider Signing Date Signing Time   Tariq Spicer M.D. Nov 26, 2017  4:01 PM          Assessment/Plan:     1. Intractable headache, unspecified chronicity pattern, unspecified headache type  CT-HEAD W/O    butalbital-acetaminophen-caffeine-codeine (FIORICET W/CODEINE) -57-30 MG per capsule    diazepam (VALIUM) 5 MG Tab   I discussed post LP headache, as well as stress that may be contributing to this headache as her  passed away one month ago.  Encouraged her to seek grief counseling.     PT instructed not to drive or operate heavy machinery while taking this medication as it causes drowsiness.  PT also instructed not to drink alcohol while taking this medication because it contains narcotics. PT verbalized understanding of these instructions.     PT should follow up with PCP in 1-2 days for re-evaluation if symptoms have not improved.    Discussed red flags and reasons to return to UC or ED.  Pt and/or family verbalized understanding of diagnosis and follow up instructions and was given informational handout on diagnosis.  PT discharged.

## 2018-01-10 DIAGNOSIS — Z12.11 SCREEN FOR COLON CANCER: ICD-10-CM

## 2018-01-20 ENCOUNTER — HOSPITAL ENCOUNTER (OUTPATIENT)
Dept: LAB | Facility: MEDICAL CENTER | Age: 53
End: 2018-01-20
Attending: PHYSICIAN ASSISTANT
Payer: COMMERCIAL

## 2018-01-20 LAB — CRP SERPL HS-MCNC: 0.04 MG/DL (ref 0–0.75)

## 2018-01-20 PROCEDURE — 83516 IMMUNOASSAY NONANTIBODY: CPT

## 2018-01-20 PROCEDURE — 82784 ASSAY IGA/IGD/IGG/IGM EACH: CPT

## 2018-01-20 PROCEDURE — 36415 COLL VENOUS BLD VENIPUNCTURE: CPT

## 2018-01-20 PROCEDURE — 86140 C-REACTIVE PROTEIN: CPT

## 2018-01-23 LAB
IGA SERPL-MCNC: 243 MG/DL (ref 68–408)
TTG IGA SER IA-ACNC: 1 U/ML (ref 0–3)

## 2018-04-09 ENCOUNTER — TELEPHONE (OUTPATIENT)
Dept: MEDICAL GROUP | Facility: PHYSICIAN GROUP | Age: 53
End: 2018-04-09

## 2018-04-09 NOTE — TELEPHONE ENCOUNTER
Future Appointments       Provider Department Center    4/10/2018 9:20 AM Celeste Green M.D. [facility]        ESTABLISHED PATIENT PRE-VISIT PLANNING     Note: Patient will not be contacted if there is no indication to call.     1.  Reviewed notes from the last few office visits within the medical group: Yes 09/20/2017    2.  If any orders were placed at last visit or intended to be done for this visit (i.e. 6 mos follow-up), do we have Results/Consult Notes?        •  Labs - Labs ordered, completed on 01/20/2018 and results are in chart.       •  Imaging - Imaging ordered, completed and results are in chart.       •  Referrals - Referral ordered, patient was seen and consult notes are in chart. Care Teams updated  YES.    3. Is this appointment scheduled as a Hospital Follow-Up? No    4.  Immunizations were updated in [employer] using Web[employer]?: Yes       •  Web [employer] Recommendations: FLU, MMR , TD and VARICELLA (Chicken Pox)     5.  Patient is due for the following Health Maintenance Topics:   Health Maintenance Due   Topic Date Due   • MAMMOGRAM  12/17/2017       6.  MDX printed for Provider? NO [facility]     7.  Patient was informed to arrive 15 min prior to their scheduled appointment and bring in their medication bottles. Confirmed through automated call

## 2018-04-10 ENCOUNTER — OFFICE VISIT (OUTPATIENT)
Dept: MEDICAL GROUP | Facility: PHYSICIAN GROUP | Age: 53
End: 2018-04-10
Payer: COMMERCIAL

## 2018-04-10 VITALS
WEIGHT: 210 LBS | TEMPERATURE: 98.2 F | BODY MASS INDEX: 33.75 KG/M2 | HEART RATE: 72 BPM | HEIGHT: 66 IN | SYSTOLIC BLOOD PRESSURE: 90 MMHG | DIASTOLIC BLOOD PRESSURE: 62 MMHG | OXYGEN SATURATION: 95 %

## 2018-04-10 DIAGNOSIS — E66.9 OBESITY (BMI 30-39.9): ICD-10-CM

## 2018-04-10 DIAGNOSIS — Z12.31 ENCOUNTER FOR SCREENING MAMMOGRAM FOR BREAST CANCER: ICD-10-CM

## 2018-04-10 DIAGNOSIS — M47.27 OSTEOARTHRITIS OF SPINE WITH RADICULOPATHY, LUMBOSACRAL REGION: ICD-10-CM

## 2018-04-10 DIAGNOSIS — M79.7 FIBROMYALGIA: ICD-10-CM

## 2018-04-10 DIAGNOSIS — Z98.890 HX OF TYMPANOSTOMY TUBES: ICD-10-CM

## 2018-04-10 DIAGNOSIS — Z00.00 HEALTHCARE MAINTENANCE: ICD-10-CM

## 2018-04-10 DIAGNOSIS — G47.00 INSOMNIA, UNSPECIFIED TYPE: ICD-10-CM

## 2018-04-10 DIAGNOSIS — Z86.69 HISTORY OF GUILLAIN-BARRE SYNDROME: Chronic | ICD-10-CM

## 2018-04-10 DIAGNOSIS — J45.990 EXERCISE-INDUCED ASTHMA: ICD-10-CM

## 2018-04-10 DIAGNOSIS — R53.82 CHRONIC FATIGUE: ICD-10-CM

## 2018-04-10 PROCEDURE — 99214 OFFICE O/P EST MOD 30 MIN: CPT | Performed by: INTERNAL MEDICINE

## 2018-04-10 ASSESSMENT — PATIENT HEALTH QUESTIONNAIRE - PHQ9: CLINICAL INTERPRETATION OF PHQ2 SCORE: 0

## 2018-04-10 NOTE — ASSESSMENT & PLAN NOTE
This is a chronic health problem that is well controlled with current medications and lifestyle measures. Currently on medication, Gabapentin 400 mg daily.

## 2018-04-10 NOTE — ASSESSMENT & PLAN NOTE
S/P fall injury in 2004 with epidural injuring Sciatic .N with Prolotherapy in 2006 . Currently following  pain clinic manages her pain.Had gait issues at that time but resolved now. Currently on medications Gabapentin 400mg daily , Relafen 500mg BID PRN.

## 2018-04-10 NOTE — ASSESSMENT & PLAN NOTE
In 1990's was having Sx of GBS following ear infection as hospitalized for more than 2 weeks at that time. Currently still have symptoms of mild generalized weakness.

## 2018-04-10 NOTE — ASSESSMENT & PLAN NOTE
This is a chronic health problem that is well controlled with current medications and lifestyle measures. Diagnosed at as a teenager, currently on when necessary albuterol occasionally.

## 2018-04-10 NOTE — PROGRESS NOTES
CC:  Establish care with new PCP, chronic fatigue.    HISTORY OF THE PRESENT ILLNESS: Patient is a 52 y.o. female. This pleasant patient is here today to establish care with new PCP and discuss her medical conditions as mentioned below.    Health Maintenance: Completed      Fibromyalgia  This is a chronic health problem that is well controlled with current medications and lifestyle measures. Currently on medication, Gabapentin 400 mg daily.    History of Guillain-Forsan syndrome  In 1990's was having Sx of GBS following ear infection as hospitalized for more than 2 weeks at that time. Currently still have symptoms of mild generalized weakness.    Osteoarthritis of spine with radiculopathy, lumbosacral region  S/P fall injury in 2004 with epidural injuring Sciatic .N with Prolotherapy in 2006 . Currently following  pain clinic manages her pain.Had gait issues at that time but resolved now. Currently on medications Gabapentin 400mg daily , Relafen 500mg BID PRN.     Exercise-induced asthma  This is a chronic health problem that is well controlled with current medications and lifestyle measures. Diagnosed at as a teenager, currently on when necessary albuterol occasionally.    Insomnia  This is a chronic health problem that is well controlled with current medications and lifestyle measures. Takes gabapentin at night.    Healthcare maintenance  Last colonoscopy done in January 2018, polypectomy done need repeat in 5 years. Last mammogram done in 2016 which was normal limits. There were a flu shots.    Hx of tympanostomy tubes  This is a chronic health problem that is well controlled with current medications and lifestyle measures. Management Dr. Nunn ENT placement of tympanostomy tubes in 2017. Used antibiotics frequently because of streptococcal infection at that time.    PHQ score 0, BMI within normal limits, no tobacco, no fall injuries    Allergies: Influenza vaccines; Keflex; Other drug; Rocephin  [ceftriaxone sodium-lidocaine]; Sulfa drugs; Tape; and Tetanus toxoids    Current Outpatient Prescriptions Ordered in [facility]   Medication Sig Dispense Refill   • MAGNESIUM GLUCONATE PO Take  by mouth.     • citalopram (CELEXA) 40 MG Tab Take 40 mg by mouth every day.     • gabapentin (NEURONTIN) 400 MG Cap Take 400 mg by mouth every day.     • albuterol 108 (90 BASE) MCG/ACT Aero Soln inhalation aerosol Inhale 2 Puffs by mouth every 6 hours as needed for Shortness of Breath. 8.5 g 0   • B Complex-C (B-COMPLEX WITH VITAMIN C) tablet Take 1 Tab by mouth every evening.     • Coenzyme Q10 (CO Q 10 PO) Take 1 Cap by mouth every evening.     • nabumetone (RELAFEN) 500 MG Tab Take 500 mg by mouth 2 times a day as needed for Moderate Pain.       No current Epic-ordered facility-administered medications on file.        Past Medical History:   Diagnosis Date   • Anemia    • Depression with anxiety    • Fibromyalgia    • Guillain-Elk (CMS-HCC) 4//2015   • H/O bladder infections    • H/O infectious mononucleosis    • H/O meningitis    • H/O: pneumonia    • Migraines        Past Surgical History:   Procedure Laterality Date   • LUMBAR DECOMPRESSION  2004   • ABDOMINAL HYSTERECTOMY TOTAL      fibroid   • OOPHORECTOMY      rt side only removed       Social History   Substance Use Topics   • Smoking status: Former Smoker     Quit date: 11/19/1989   • Smokeless tobacco: Never Used      Comment: avoid all tobacco products   • Alcohol use 0.0 oz/week      Comment: rarely       Social History     Social History Narrative   • No narrative on file       Family History   Problem Relation Age of Onset   • Heart Disease Mother    • Heart Disease Father    • Cancer Maternal Grandmother      colon    • Cancer Paternal Grandmother      ovarian       ROS:     - Constitutional: Chronic fatigue Negative for fever, chills, unexpected weight change, and fatigue/generalized weakness.     - HEENT: Negative for headaches, vision changes, hearing  "changes, ear pain, ear discharge, rhinorrhea, sinus congestion, sore throat, and neck pain.      - Respiratory: Negative for cough, sputum production, chest congestion, dyspnea, wheezing, and crackles.      - Cardiovascular: Negative for chest pain, palpitations, orthopnea, and bilateral lower extremity edema.     - Gastrointestinal: Negative for heartburn, nausea, vomiting, abdominal pain, hematochezia, melena, diarrhea, constipation, and greasy/foul-smelling stools.     - Genitourinary: Negative for dysuria, polyuria, hematuria, pyuria, urinary urgency, and urinary incontinence.     - Musculoskeletal: Negative for myalgias, back pain, and joint pain.     - Skin: Negative for rash, itching, cyanotic skin color change.     - Neurological: Negative for dizziness, tingling, tremors, focal sensory deficit, focal weakness and headaches.     - Endo/Heme/Allergies: Does not bruise/bleed easily.     - Psychiatric/Behavioral: Negative for depression, suicidal/homicidal ideation and memory loss.       Last level melanoma 2017 reviewed and discussed with the patient.    Exam: Blood pressure (!) 90/62, pulse 72, temperature 36.8 °C (98.2 °F), height 1.676 m (5' 6\"), weight 95.3 kg (210 lb), SpO2 95 %. Body mass index is 33.89 kg/m².  That pressure in 90s/60s but asymptomatic.    General: Normal appearing. No distress.  HEENT: Normocephalic. Eyes conjunctiva clear lids without ptosis, pupils equal and reactive to light accommodation, ears normal shape and contour, canals are clear bilaterally, tympanic membranes are benign, nasal mucosa benign, oropharynx is without erythema, edema or exudates.   Neck: Supple without JVD or bruit. Thyroid is not enlarged.  Pulmonary: Clear to ausculation.  Normal effort. No rales, ronchi, or wheezing.  Cardiovascular: Regular rate and rhythm without murmur. Carotid and radial pulses are intact and equal bilaterally.  Abdomen: Soft, nontender, nondistended. Normal bowel sounds. Liver and spleen " are not palpable  Neurologic: Grossly nonfocal  Lymph: No cervical, supraclavicular or axillary lymph nodes are palpable  Skin: Warm and dry.  No obvious lesions.  Musculoskeletal: Normal gait. No extremity cyanosis, clubbing, or edema.  Psych: Normal mood and affect. Alert and oriented x3. Judgment and insight is normal.    Please note that this dictation was created using voice recognition software. I have made every reasonable attempt to correct obvious errors, but I expect that there are errors of grammar and possibly content that I did not discover before finalizing the note.      Assessment/Plan  1. Chronic fatigue  2. History of Guillain-[place]om[place] syndrome  3. Fibromyalgia  Well-controlled, follows pain management and currently on medications gabapentin 400 mg daily and Relafen 5 mg twice a day to continue the same at this time.      4. Obesity (BMI 30-39.9)  Pt educated on the increase of morbidity and mortality associated with excess weight including DM, Heart Disease, HTN, stroke, and sleep apnea.  Pt advised weight loss of 5% through reduced calorie, low fat diet and 150 mins of exercise a week  Recommend obesity clinic if patient is unsuccessful with weight loss  - Patient identified as having weight management issue.  Appropriate orders and counseling given.  - REFERRAL TO Count includes the [facility] IMPROVEMENT PROGRAMS (HIP) Services Requested: Weight Management Program; Reason for Visit: Overweight/Obesity; Future    5. Osteoarthritis of spine with radiculopathy, lumbosacral region  Follows pain management Dr. Love, to continue the current medications gabapentin 400 mg daily and Relafen 5 mg twice a day    6. Exercise-induced asthma  Continue the current medication when necessary albuterol.    7. Insomnia, unspecified type  Patient endorses that her current Neurontin 400 mg daily and also everyday marijuana before sleep as well controlled for insomnia.    8. Encounter for screening mammogram for breast cancer  Last  mammogram in 2016, she is due for one at this time.  - MA-SCREEN MAMMO W/CAD-BILAT; Future    9. Hx of tympanostomy tubes  Follow-up with ENT Dr. Nunn, placement of Tympanostomy tubes in 2017 due to ear infections. Currently asymptomatic.

## 2018-04-10 NOTE — ASSESSMENT & PLAN NOTE
Last colonoscopy done in January 2018, polypectomy done need repeat in 5 years. Last mammogram done in 2016 which was normal limits. Flu shot contraindicated due to GBS.

## 2018-04-10 NOTE — ASSESSMENT & PLAN NOTE
This is a chronic health problem that is well controlled with current medications and lifestyle measures. Management Dr. Nunn ENT placement of tympanostomy tubes in 2017. Used antibiotics frequently because of streptococcal infection at that time.

## 2018-04-10 NOTE — ASSESSMENT & PLAN NOTE
This is a chronic health problem that is well controlled with current medications and lifestyle measures. Takes gabapentin at night.

## 2018-05-17 ENCOUNTER — HOSPITAL ENCOUNTER (OUTPATIENT)
Dept: RADIOLOGY | Facility: MEDICAL CENTER | Age: 53
End: 2018-05-17
Attending: INTERNAL MEDICINE
Payer: COMMERCIAL

## 2018-05-17 DIAGNOSIS — Z12.31 ENCOUNTER FOR SCREENING MAMMOGRAM FOR BREAST CANCER: ICD-10-CM

## 2018-05-17 PROCEDURE — 77067 SCR MAMMO BI INCL CAD: CPT

## 2018-05-18 ENCOUNTER — TELEPHONE (OUTPATIENT)
Dept: MEDICAL GROUP | Facility: PHYSICIAN GROUP | Age: 53
End: 2018-05-18

## 2018-05-18 NOTE — TELEPHONE ENCOUNTER
----- Message from Celeste Green M.D. sent at 5/17/2018  9:34 PM PDT -----  I reviewed the results of your mammogram. Radiologist reports normal findings. I recommend recheck in one year.Thanks.  Celeste Green M.D.

## 2018-05-20 ENCOUNTER — OFFICE VISIT (OUTPATIENT)
Dept: URGENT CARE | Facility: CLINIC | Age: 53
End: 2018-05-20
Payer: COMMERCIAL

## 2018-05-20 VITALS
RESPIRATION RATE: 18 BRPM | TEMPERATURE: 98.2 F | HEART RATE: 68 BPM | WEIGHT: 205 LBS | BODY MASS INDEX: 32.95 KG/M2 | HEIGHT: 66 IN | SYSTOLIC BLOOD PRESSURE: 120 MMHG | OXYGEN SATURATION: 97 % | DIASTOLIC BLOOD PRESSURE: 78 MMHG

## 2018-05-20 DIAGNOSIS — B02.9 HERPES ZOSTER WITHOUT COMPLICATION: ICD-10-CM

## 2018-05-20 PROCEDURE — 99214 OFFICE O/P EST MOD 30 MIN: CPT | Performed by: PHYSICIAN ASSISTANT

## 2018-05-20 RX ORDER — VALACYCLOVIR HYDROCHLORIDE 1 G/1
1000 TABLET, FILM COATED ORAL 3 TIMES DAILY
Qty: 21 TAB | Refills: 0 | Status: SHIPPED | OUTPATIENT
Start: 2018-05-20 | End: 2018-05-27

## 2018-05-20 RX ORDER — HYDROCODONE BITARTRATE AND ACETAMINOPHEN 5; 325 MG/1; MG/1
1-2 TABLET ORAL EVERY 4 HOURS PRN
Qty: 12 TAB | Refills: 0 | Status: SHIPPED | OUTPATIENT
Start: 2018-05-20 | End: 2018-05-23

## 2018-05-20 ASSESSMENT — ENCOUNTER SYMPTOMS
SHORTNESS OF BREATH: 0
PALPITATIONS: 0
COUGH: 0
FEVER: 0

## 2018-05-20 NOTE — PROGRESS NOTES
Subjective:      Keiko Burgos is a 52 y.o. female who presents with Rash (Couple days red spots on middle back and under left right breast)            Rash   This is a new problem. The current episode started in the past 7 days. The problem has been gradually worsening since onset. The affected locations include the abdomen. The rash is characterized by blistering and burning. She was exposed to nothing. Pertinent negatives include no cough, fever or shortness of breath. Past treatments include nothing.       Review of Systems   Constitutional: Negative for fever and malaise/fatigue.   Respiratory: Negative for cough and shortness of breath.    Cardiovascular: Negative for chest pain and palpitations.   Skin: Positive for itching and rash.   All other systems reviewed and are negative.    PMH:  has a past medical history of Anemia; Depression with anxiety; Fibromyalgia; Guillain-Clark[place] (HCC) (4//2015); H/O bladder infections; H/O infectious mononucleosis; H/O meningitis; H/O: pneumonia; and Migraines.  MEDS:   Current Outpatient Prescriptions:   •  valacyclovir (VALTREX) 1 GM Tab, Take 1 Tab by mouth 3 times a day for 7 days., Disp: 21 Tab, Rfl: 0  •  HYDROcodone-acetaminophen (NORCO) 5-325 MG Tab per tablet, Take 1-2 Tabs by mouth every four hours as needed for up to 3 days., Disp: 12 Tab, Rfl: 0  •  MAGNESIUM GLUCONATE PO, Take  by mouth., Disp: , Rfl:   •  citalopram (CELEXA) 40 MG Tab, Take 40 mg by mouth every day., Disp: , Rfl:   •  gabapentin (NEURONTIN) 400 MG Cap, Take 400 mg by mouth every day., Disp: , Rfl:   •  albuterol 108 (90 BASE) MCG/ACT Aero Soln inhalation aerosol, Inhale 2 Puffs by mouth every 6 hours as needed for Shortness of Breath., Disp: 8.5 g, Rfl: 0  •  B Complex-C (B-COMPLEX WITH VITAMIN C) tablet, Take 1 Tab by mouth every evening., Disp: , Rfl:   •  Coenzyme Q10 (CO Q 10 PO), Take 1 Cap by mouth every evening., Disp: , Rfl:   •  nabumetone (RELAFEN) 500 MG Tab, Take 500 mg by mouth  "2 times a day as needed for Moderate Pain., Disp: , Rfl:   ALLERGIES:   Allergies   Allergen Reactions   • Influenza Vaccines Unspecified     Hx of Guillian Olin   • Keflex Unspecified     Hx of Guillian Olin   • Other Drug Unspecified     Pneumonia vaccine. Hx of Guillian Olin   • Rocephin [Ceftriaxone Sodium-Lidocaine] Unspecified     PT WILL NOT TAKE MEDICATION. NOT ALLERGIC.   • Sulfa Drugs Unspecified     Hx of Guillian Olin   • Tape Unspecified     Blister. Paper tape ok per patient   • Tetanus Toxoids Unspecified     Hx of Guillian Olin     SURGHX:   Past Surgical History:   Procedure Laterality Date   • LUMBAR DECOMPRESSION  2004   • ABDOMINAL HYSTERECTOMY TOTAL      fibroid   • OOPHORECTOMY      rt side only removed     SOCHX:  reports that she quit smoking about 28 years ago. She has never used smokeless tobacco. She reports that she drinks alcohol. She reports that she uses drugs, including Marijuana.  FH: Family history was reviewed, no pertinent findings to report  Medications, Allergies, and current problem list reviewed today in Epic       Objective:     /78   Pulse 68   Temp 36.8 °C (98.2 °F)   Resp 18   Ht 1.676 m (5' 6\")   Wt 93 kg (205 lb)   SpO2 97%   BMI 33.09 kg/m²      Physical Exam   Constitutional: She appears well-developed and well-nourished.   Cardiovascular: Normal rate, regular rhythm and normal heart sounds.    Pulmonary/Chest: Effort normal and breath sounds normal.   Skin: Skin is warm and dry. Rash noted. Rash is vesicular. There is erythema.        Psychiatric: She has a normal mood and affect. Her behavior is normal. Judgment and thought content normal.   Vitals reviewed.              Assessment/Plan:     1. Herpes zoster without complication    - valacyclovir (VALTREX) 1 GM Tab; Take 1 Tab by mouth 3 times a day for 7 days.  Dispense: 21 Tab; Refill: 0  - HYDROcodone-acetaminophen (NORCO) 5-325 MG Tab per tablet; Take 1-2 Tabs by mouth every four hours as needed " for up to 3 days.  Dispense: 12 Tab; Refill: 0  - CONSENT FOR OPIATE PRESCRIPTION    [place]  Aware web site evaluation: I have obtained and reviewed patient utilization report from [facility] pharmacy database prior to writing prescription for controlled substance II, III or IV per [place] bill . Based on the report and my clinical assessment the prescription is medically necessary.     Differential diagnosis, natural history, supportive care discussed. Follow-up with primary care provider within 7-10 days, emergency room precautions discussed.  Patient and/or family appears understanding of information.

## 2018-05-23 ENCOUNTER — OFFICE VISIT (OUTPATIENT)
Dept: URGENT CARE | Facility: CLINIC | Age: 53
End: 2018-05-23
Payer: COMMERCIAL

## 2018-05-23 VITALS
SYSTOLIC BLOOD PRESSURE: 132 MMHG | DIASTOLIC BLOOD PRESSURE: 84 MMHG | TEMPERATURE: 98.4 F | WEIGHT: 202 LBS | HEIGHT: 66 IN | RESPIRATION RATE: 16 BRPM | HEART RATE: 99 BPM | OXYGEN SATURATION: 98 % | BODY MASS INDEX: 32.47 KG/M2

## 2018-05-23 DIAGNOSIS — B02.9 HERPES ZOSTER WITHOUT COMPLICATION: ICD-10-CM

## 2018-05-23 PROCEDURE — 99213 OFFICE O/P EST LOW 20 MIN: CPT | Performed by: PHYSICIAN ASSISTANT

## 2018-05-23 ASSESSMENT — ENCOUNTER SYMPTOMS
VOMITING: 0
COUGH: 0
CHILLS: 0
TINGLING: 0
NAUSEA: 1
MYALGIAS: 1
SENSORY CHANGE: 0
FEVER: 0
FOCAL WEAKNESS: 0
SHORTNESS OF BREATH: 0

## 2018-05-23 NOTE — PROGRESS NOTES
Subjective:      Keiko Burgos is a 52 y.o. female who presents with Herpes Zoster            Patient is here today with complaints of shingles rash over the past 3 days. She has started on Valtrex. She is here requesting a note for work because she feels her pain is so unbearable that she cannot work. She denies worsening symptoms. She has not been taking the pain medication. She denies fever or chills.      Past Medical History:   Diagnosis Date   • Anemia    • Depression with anxiety    • Fibromyalgia    • Guillain-Tyler (HCC) 4//2015   • H/O bladder infections    • H/O infectious mononucleosis    • H/O meningitis    • H/O: pneumonia    • Migraines        Past Surgical History:   Procedure Laterality Date   • LUMBAR DECOMPRESSION  2004   • ABDOMINAL HYSTERECTOMY TOTAL      fibroid   • OOPHORECTOMY      rt side only removed       Family History   Problem Relation Age of Onset   • Heart Disease Mother    • Heart Disease Father    • Cancer Maternal Grandmother      colon    • Cancer Paternal Grandmother      ovarian       Allergies   Allergen Reactions   • Influenza Vaccines Unspecified     Hx of Guillian Tyler   • Keflex Unspecified     Hx of Guillian Tyler   • Other Drug Unspecified     Pneumonia vaccine. Hx of Guillian Tyler   • Rocephin [Ceftriaxone Sodium-Lidocaine] Unspecified     PT WILL NOT TAKE MEDICATION. NOT ALLERGIC.   • Sulfa Drugs Unspecified     Hx of Guillian Tyler   • Tape Unspecified     Blister. Paper tape ok per patient   • Tetanus Toxoids Unspecified     Hx of Guillian Tyler       Medications, Allergies, and current problem list reviewed today in Epic      Review of Systems   Constitutional: Negative for chills and fever.   Respiratory: Negative for cough and shortness of breath.    Gastrointestinal: Positive for nausea. Negative for vomiting.   Musculoskeletal: Positive for myalgias.   Skin: Positive for rash.   Neurological: Negative for tingling, sensory change and focal weakness.     All  "other systems reviewed and are negative.        Objective:     /84   Pulse 99   Temp 36.9 °C (98.4 °F)   Resp 16   Ht 1.676 m (5' 6\")   Wt 91.6 kg (202 lb)   SpO2 98%   BMI 32.60 kg/m²      Physical Exam   Constitutional: She is oriented to person, place, and time. She appears well-developed and well-nourished. No distress.   HENT:   Head: Normocephalic and atraumatic.   Eyes: Conjunctivae are normal.   Pulmonary/Chest: Effort normal. No respiratory distress.   Neurological: She is alert and oriented to person, place, and time. No cranial nerve deficit.   Skin: Rash noted. Rash is vesicular.        Psychiatric: She has a normal mood and affect. Her behavior is normal. Judgment and thought content normal.               Assessment/Plan:     1. Herpes zoster without complication    Work note given.  Continue current treatment  No signs of secondary bacterial infection.   Reassurance given.     Differential diagnoses, Supportive care, and indications for immediate follow-up discussed with patient.   Instructed to return to clinic or nearest emergency department for any change in condition, further concerns, or worsening of symptoms.    The patient demonstrated a good understanding and agreed with the treatment plan.    Nathalia Raines P.A.-C.        "

## 2018-05-23 NOTE — LETTER
May 23, 2018         Patient: Keiko Burgos   YOB: 1965   Date of Visit: 5/23/2018           To Whom it May Concern:    Keiko Burgos was seen in my clinic on 5/23/2018 for medical reasons. She is excused from work and may return on 5/28/18.    If you have any questions or concerns, please don't hesitate to call.        Sincerely,           Nathalia Raines P.A.-C.  Electronically Signed

## 2018-07-25 ENCOUNTER — GYNECOLOGY VISIT (OUTPATIENT)
Dept: OBGYN | Facility: MEDICAL CENTER | Age: 53
End: 2018-07-25
Payer: COMMERCIAL

## 2018-07-25 ENCOUNTER — HOSPITAL ENCOUNTER (OUTPATIENT)
Facility: MEDICAL CENTER | Age: 53
End: 2018-07-25
Attending: OBSTETRICS & GYNECOLOGY
Payer: COMMERCIAL

## 2018-07-25 ENCOUNTER — HOSPITAL ENCOUNTER (OUTPATIENT)
Dept: LAB | Facility: MEDICAL CENTER | Age: 53
End: 2018-07-25
Attending: OBSTETRICS & GYNECOLOGY
Payer: COMMERCIAL

## 2018-07-25 VITALS
BODY MASS INDEX: 32.95 KG/M2 | SYSTOLIC BLOOD PRESSURE: 122 MMHG | HEIGHT: 66 IN | WEIGHT: 205 LBS | DIASTOLIC BLOOD PRESSURE: 78 MMHG

## 2018-07-25 DIAGNOSIS — Z12.4 PAP SMEAR FOR CERVICAL CANCER SCREENING: ICD-10-CM

## 2018-07-25 DIAGNOSIS — Z11.51 SCREENING FOR HUMAN PAPILLOMAVIRUS (HPV): ICD-10-CM

## 2018-07-25 DIAGNOSIS — Z11.3 SCREENING FOR STDS (SEXUALLY TRANSMITTED DISEASES): ICD-10-CM

## 2018-07-25 DIAGNOSIS — Z01.419 WELL WOMAN EXAM WITH ROUTINE GYNECOLOGICAL EXAM: ICD-10-CM

## 2018-07-25 DIAGNOSIS — N84.1 CERVICAL POLYP: ICD-10-CM

## 2018-07-25 PROCEDURE — 87624 HPV HI-RISK TYP POOLED RSLT: CPT

## 2018-07-25 PROCEDURE — 87491 CHLMYD TRACH DNA AMP PROBE: CPT

## 2018-07-25 PROCEDURE — 99396 PREV VISIT EST AGE 40-64: CPT | Performed by: OBSTETRICS & GYNECOLOGY

## 2018-07-25 PROCEDURE — 87389 HIV-1 AG W/HIV-1&-2 AB AG IA: CPT

## 2018-07-25 PROCEDURE — 88175 CYTOPATH C/V AUTO FLUID REDO: CPT

## 2018-07-25 PROCEDURE — 36415 COLL VENOUS BLD VENIPUNCTURE: CPT

## 2018-07-25 PROCEDURE — 86803 HEPATITIS C AB TEST: CPT

## 2018-07-25 PROCEDURE — 86780 TREPONEMA PALLIDUM: CPT

## 2018-07-25 PROCEDURE — 87591 N.GONORRHOEAE DNA AMP PROB: CPT

## 2018-07-25 NOTE — PROGRESS NOTES
"Subjective:      Keiko Burgos is a 53 y.o. female who presents with Annual Exam (annual exam)        CC: annual exam    HPI: 52 yo  female s/p Supracervical Hysterectomy and LSO presents for annual exam.  No hx of abnormal pap smears.  Requests STD testing.  Denies vaginal bleeding, hot flashes, mood swings, vaginal dryness.      ROS REVIEW OF SYSTEMS:    Pertinent positives and negatives mentioned in HPI.    All other systems reviewed and are negative or noncontributory.       Objective:     /78   Ht 1.676 m (5' 6\")   Wt 93 kg (205 lb)   Breastfeeding? No   BMI 33.09 kg/m²      Physical Exam      GENERAL: Alert, in no apparent distress  PSYCHIATRIC: Appropriate affect, intact insight and judgement.  NECK:  Nontender, no masses.  No Thyromegaly or nodules. No lymphadenopathy.  RESPIRATORY: Normal respiratory effort.  Lungs clear to auscultation.   CARDIOVASCULAR: RRR, no murmur, gallop, or rub.  ABDOMEN: Soft, nontender, nondistended.  No palpable masses.  No rebound or guarding.  No inguinal lymphadenopathy.  No hepatosplenomegaly.  No hernias.  BACK: No CVA tenderness  EXTREMITIES: No edema  SKIN: No rash    BREAST: No masses or tenderness.  No skin changes.  No nipple inversion or discharge. No axillary lymphadenopathy.      GENITOURINARY:  Normal external genitalia, no lesions.  Normal urethral meatus, no masses or tenderness.  Normal bladder without fullness or masses.  Vagina well estrogenized, no vaginal discharge or lesions.  Cervix friable, with 1 cm polyp, NT.  Uterus absent  Adnexa nontender, no masses.  Normal anus and perineum.    Rectal Exam - not indicated.       Assessment/Plan:     1. Well woman exam with routine gynecological exam  Reviewed monthly self breast exams and need for yearly mammograms starting at age 40.  Discussed calcium intake, Vitamin D,  and weight bearing exercise for bone health.   - THINPREP PAP W/HPV AND CTNG; Future    2. Pap smear for cervical cancer " screening  - THINPREP PAP W/HPV AND CTNG; Future    3. Screening for human papillomavirus (HPV)  - THINPREP PAP W/HPV AND CTNG; Future    4. Screening for STDs (sexually transmitted diseases)  - THINPREP PAP W/HPV AND CTNG; Future  - HIV ANTIBODIES; Future  - T.PALLIDUM AB EIA; Future  - HEP C VIRUS ANTIBODY; Future    5. Cervical polyp  Referral placed for polypectomy.    F/U 2 weeks for cervical polypectomy.  - REFERRAL TO GYNECOLOGY

## 2018-07-26 DIAGNOSIS — Z01.419 WELL WOMAN EXAM WITH ROUTINE GYNECOLOGICAL EXAM: ICD-10-CM

## 2018-07-26 DIAGNOSIS — Z11.3 SCREENING FOR STDS (SEXUALLY TRANSMITTED DISEASES): ICD-10-CM

## 2018-07-26 DIAGNOSIS — Z11.51 SCREENING FOR HUMAN PAPILLOMAVIRUS (HPV): ICD-10-CM

## 2018-07-26 DIAGNOSIS — Z12.4 PAP SMEAR FOR CERVICAL CANCER SCREENING: ICD-10-CM

## 2018-07-26 LAB
HCV AB SER QL: NEGATIVE
HIV 1+2 AB+HIV1 P24 AG SERPL QL IA: NON REACTIVE
TREPONEMA PALLIDUM IGG+IGM AB [PRESENCE] IN SERUM OR PLASMA BY IMMUNOASSAY: NON REACTIVE

## 2018-07-27 LAB
C TRACH DNA GENITAL QL NAA+PROBE: NEGATIVE
CYTOLOGY REG CYTOL: NORMAL
HPV HR 12 DNA CVX QL NAA+PROBE: NEGATIVE
HPV16 DNA SPEC QL NAA+PROBE: NEGATIVE
HPV18 DNA SPEC QL NAA+PROBE: NEGATIVE
N GONORRHOEA DNA GENITAL QL NAA+PROBE: NEGATIVE
SPECIMEN SOURCE: NORMAL
SPECIMEN SOURCE: NORMAL

## 2018-09-27 ENCOUNTER — GYNECOLOGY VISIT (OUTPATIENT)
Dept: OBGYN | Facility: MEDICAL CENTER | Age: 53
End: 2018-09-27
Payer: COMMERCIAL

## 2018-09-27 ENCOUNTER — HOSPITAL ENCOUNTER (OUTPATIENT)
Facility: MEDICAL CENTER | Age: 53
End: 2018-09-27
Attending: OBSTETRICS & GYNECOLOGY
Payer: COMMERCIAL

## 2018-09-27 VITALS
BODY MASS INDEX: 33.75 KG/M2 | DIASTOLIC BLOOD PRESSURE: 88 MMHG | WEIGHT: 210 LBS | HEIGHT: 66 IN | SYSTOLIC BLOOD PRESSURE: 130 MMHG

## 2018-09-27 DIAGNOSIS — N84.1 CERVICAL POLYP: ICD-10-CM

## 2018-09-27 PROCEDURE — 88305 TISSUE EXAM BY PATHOLOGIST: CPT

## 2018-09-27 PROCEDURE — 57500 BIOPSY OF CERVIX: CPT | Performed by: OBSTETRICS & GYNECOLOGY

## 2018-09-27 NOTE — PROGRESS NOTES
PROCEDURE NOTE:     Cervical polypectomy    Informed consent obtained.   Speculum placed  Cervix prepped with Betadine x 3  1.0 cm x 5 mm fleshy polyp grasped with [facility] forceps and removed.  Base of polyp treated with silver nitrate.  Hemostasis obtained.   Patient tolerated the procedure well.  There were no complications.    Will call with pathology results.    F/U prn.

## 2018-11-14 ENCOUNTER — TELEPHONE (OUTPATIENT)
Dept: MEDICAL GROUP | Facility: PHYSICIAN GROUP | Age: 53
End: 2018-11-14

## 2018-11-14 NOTE — TELEPHONE ENCOUNTER
Future Appointments       Provider Department Center    11/15/2018 4:00 PM Celeste Green M.D. [facility]        ESTABLISHED PATIENT PRE-VISIT PLANNING     Patient was contacted to complete PVP.     Note: Patient will not be contacted if there is no indication to call.     1.  Reviewed notes from the last few office visits within the medical group: Yes    2.  If any orders were placed at last visit or intended to be done for this visit (i.e. 6 mos follow-up), do we have Results/Consult Notes?        •  Labs - Labs were not ordered at last office visit.       •  Imaging - Imaging ordered, completed and results are in chart.       •  Referrals - No referrals were ordered at last office visit.    3. Is this appointment scheduled as a Hospital Follow-Up? No    4.  Immunizations were updated in [facility] using WebIZ?: Yes       •  Web Iz Recommendations: FLU, MMR , TD and ZOSTAVAX (Shingles)    5.  Patient is due for the following Health Maintenance Topics:   Health Maintenance Due   Topic Date Due   • IMM ZOSTER VACCINES (1 of 2) 06/18/2017       6.  MDX printed for Provider? NO    7.  Patient was informed to arrive 15 min prior to their scheduled appointment and bring in their medication bottles.

## 2018-11-15 ENCOUNTER — OFFICE VISIT (OUTPATIENT)
Dept: MEDICAL GROUP | Facility: PHYSICIAN GROUP | Age: 53
End: 2018-11-15
Payer: COMMERCIAL

## 2018-11-15 VITALS
TEMPERATURE: 98.4 F | HEIGHT: 66 IN | BODY MASS INDEX: 34.23 KG/M2 | WEIGHT: 213 LBS | HEART RATE: 72 BPM | SYSTOLIC BLOOD PRESSURE: 126 MMHG | DIASTOLIC BLOOD PRESSURE: 72 MMHG | OXYGEN SATURATION: 98 %

## 2018-11-15 DIAGNOSIS — E66.9 CLASS 1 OBESITY WITH BODY MASS INDEX (BMI) OF 34.0 TO 34.9 IN ADULT, UNSPECIFIED OBESITY TYPE, UNSPECIFIED WHETHER SERIOUS COMORBIDITY PRESENT: ICD-10-CM

## 2018-11-15 DIAGNOSIS — E66.9 OBESITY (BMI 30-39.9): ICD-10-CM

## 2018-11-15 DIAGNOSIS — R60.9 PERIPHERAL EDEMA: ICD-10-CM

## 2018-11-15 DIAGNOSIS — D64.9 ANEMIA, UNSPECIFIED TYPE: ICD-10-CM

## 2018-11-15 PROBLEM — R60.0 PERIPHERAL EDEMA: Status: ACTIVE | Noted: 2018-11-15

## 2018-11-15 PROCEDURE — 99214 OFFICE O/P EST MOD 30 MIN: CPT | Performed by: INTERNAL MEDICINE

## 2018-11-15 RX ORDER — FUROSEMIDE 20 MG/1
20 TABLET ORAL 2 TIMES DAILY
Qty: 60 TAB | Refills: 1 | Status: SHIPPED | OUTPATIENT
Start: 2018-11-15 | End: 2019-01-03 | Stop reason: SDUPTHER

## 2018-11-16 NOTE — PROGRESS NOTES
CC: Acute visit, peripheral edema.  Patient is an established patient of mine.    HISTORY OF PRESENT ILLNESS: Patient is a 53 y.o. female established patient who presents today to discuss her medical conditions as mentioned in HPI below.    Health Maintenance: Completed    Peripheral edema  This is a new problem started 2 weeks back and has been worsening. Pt has taken Botox injection recently for migraine , but she says that the edema developed before the botox. No new medications except the regular meds which she is been taking in the past.      PHQ score 0, BMI > 34 , former tobacco, no fall injuries.    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 60 Tab 1   • MAGNESIUM GLUCONATE PO Take  by mouth.     • citalopram (CELEXA) 40 MG Tab Take 40 mg by mouth every day.     • gabapentin (NEURONTIN) 400 MG Cap Take 400 mg by mouth every day.     • B Complex-C (B-COMPLEX WITH VITAMIN C) tablet Take 1 Tab by mouth every evening.     • Coenzyme Q10 (CO Q 10 PO) Take 1 Cap by mouth every evening.     • nabumetone (RELAFEN) 500 MG Tab Take 500 mg by mouth 2 times a day as needed  for Moderate Pain.     • albuterol 108 (90 BASE) MCG/ACT Aero Soln inhalation aerosol Inhale 2 Puffs by mouth every 6 hours as needed for Shortness of Breath. 8.5 g 0     No current facility-administered medications for this visit.        Social History   Substance Use Topics   • Smoking status: Former Smoker     Quit date: 11/19/1989   • Smokeless tobacco: Never Used      Comment: avoid all tobacco products   • Alcohol use 0.0 oz/week      Comment: rarely     Social History     Social History Narrative   • No narrative on file       Family History   Problem Relation Age of Onset   • Heart Disease Mother    • Heart Disease Father    • Cancer Maternal Grandmother         colon    • Cancer Paternal Grandmother         ovarian        ROS:     - Constitutional:  Negative for fever, chills, unexpected weight change, and fatigue/generalized weakness.    - HEENT:  Negative for headaches, vision changes, hearing changes, ear pain, ear discharge, rhinorrhea, sinus congestion, sore throat, and neck pain.      - Respiratory: Negative for cough, sputum production, chest congestion, dyspnea, wheezing, and crackles.      - Cardiovascular: Positive for peripheral edema bilaterally negative for chest pain, palpitations, orthopnea    - Gastrointestinal: Negative for heartburn, nausea, vomiting, abdominal pain, hematochezia, melena, diarrhea, constipation, and greasy/foul-smelling stools.     - Genitourinary: Negative for dysuria, polyuria, hematuria, pyuria, urinary urgency, and urinary incontinence.     - Musculoskeletal: Negative for myalgias, back pain, and joint pain.     - Skin: Negative for rash, itching, cyanotic skin color change.     - Neurological: Negative for dizziness, tingling, tremors, focal sensory deficit, focal weakness and headaches.     - Endo/Heme/Allergies: Does not bruise/bleed easily.     - Psychiatric/Behavioral: Negative for depression, suicidal/homicidal ideation and memory loss.      Last lab work in  "November 2017 reviewed and discussed with the patient.    Exam:    Blood pressure 126/72, pulse 72, temperature 36.9 °C (98.4 °F), temperature source Temporal, height 1.676 m (5' 6\"), weight 96.6 kg (213 lb), SpO2 98 %, not currently breastfeeding. Body mass index is 34.38 kg/m².    General:  Well nourished, well developed female in NAD  Head is grossly normal.  Neck: Supple without JVD or bruit. Thyroid is not enlarged.  Pulmonary: Clear to ausculation and percussion.  Normal effort. No rales, ronchi, or wheezing.  Cardiovascular: Regular rate and rhythm without murmur. Carotid and radial pulses are intact and equal bilaterally.  Positive for bilateral lower extremity nonpitting type.  Extremities: no clubbing, cyanosis.    Please note that this dictation was created using voice recognition software. I have made every reasonable attempt to correct obvious errors, but I expect that there are errors of grammar and possibly content that I did not discover before finalizing the note.    Assessment/Plan:  1. Peripheral edema  New problem, given the patient's symptoms of recent Botox injection I was suspecting that it might be an allergic reaction but patient also states that her edema completely resolves when she sees it in the morning after waking up, worsens as the day passes by.  So this is most likely fluid retention rather than lymphedema.  Will give her low-dose Lasix 20 mg twice daily, check her blood work including CBC, CMP and BNP for further evaluation.  - CBC WITH DIFFERENTIAL; Future  - COMP METABOLIC PANEL; Future  - BTYPE NATRIURETIC PEPTIDE; Future  - furosemide (LASIX) 20 MG Tab; Take 1 Tab by mouth 2 times a day.  Dispense: 60 Tab; Refill: 1    2. Anemia, unspecified type  Last CBC in November 2017+ for anemia.  Will recheck her CBC and treat if needed.  - CBC WITH DIFFERENTIAL; Future    3. Obesity (BMI 30-39.9)  4. Class 1 obesity with body mass index (BMI) of 34.0 to 34.9 in adult, unspecified " obesity type, unspecified whether serious comorbidity present  Pt educated on the increase of morbidity and mortality associated with excess weight including DM, Heart Disease, HTN, stroke, and sleep apnea.  Pt advised weight loss of 5% through reduced calorie, low fat diet and 150 mins of exercise a week  Recommend obesity clinic if patient is unsuccessful with weight loss

## 2018-11-16 NOTE — ASSESSMENT & PLAN NOTE
This is a new problem started 2 weeks back and has been worsening. Pt has taken Botox injection recently for migraine , but she says that the edema developed before the botox. No new medications except the regular meds which she is been taking in the past.

## 2018-11-20 ENCOUNTER — HOSPITAL ENCOUNTER (OUTPATIENT)
Dept: LAB | Facility: MEDICAL CENTER | Age: 53
End: 2018-11-20
Attending: INTERNAL MEDICINE
Payer: COMMERCIAL

## 2018-11-20 DIAGNOSIS — D64.9 ANEMIA, UNSPECIFIED TYPE: ICD-10-CM

## 2018-11-20 DIAGNOSIS — R60.9 PERIPHERAL EDEMA: ICD-10-CM

## 2018-11-20 LAB
ALBUMIN SERPL BCP-MCNC: 3.9 G/DL (ref 3.2–4.9)
ALBUMIN/GLOB SERPL: 1.4 G/DL
ALP SERPL-CCNC: 70 U/L (ref 30–99)
ALT SERPL-CCNC: 19 U/L (ref 2–50)
ANION GAP SERPL CALC-SCNC: 7 MMOL/L (ref 0–11.9)
AST SERPL-CCNC: 14 U/L (ref 12–45)
BASOPHILS # BLD AUTO: 0.7 % (ref 0–1.8)
BASOPHILS # BLD: 0.05 K/UL (ref 0–0.12)
BILIRUB SERPL-MCNC: 0.6 MG/DL (ref 0.1–1.5)
BNP SERPL-MCNC: 17 PG/ML (ref 0–100)
BUN SERPL-MCNC: 14 MG/DL (ref 8–22)
CALCIUM SERPL-MCNC: 9.1 MG/DL (ref 8.5–10.5)
CHLORIDE SERPL-SCNC: 103 MMOL/L (ref 96–112)
CO2 SERPL-SCNC: 30 MMOL/L (ref 20–33)
CREAT SERPL-MCNC: 1.08 MG/DL (ref 0.5–1.4)
EOSINOPHIL # BLD AUTO: 0.11 K/UL (ref 0–0.51)
EOSINOPHIL NFR BLD: 1.6 % (ref 0–6.9)
ERYTHROCYTE [DISTWIDTH] IN BLOOD BY AUTOMATED COUNT: 43.5 FL (ref 35.9–50)
FASTING STATUS PATIENT QL REPORTED: NORMAL
GLOBULIN SER CALC-MCNC: 2.8 G/DL (ref 1.9–3.5)
GLUCOSE SERPL-MCNC: 82 MG/DL (ref 65–99)
HCT VFR BLD AUTO: 46.2 % (ref 37–47)
HGB BLD-MCNC: 15.1 G/DL (ref 12–16)
IMM GRANULOCYTES # BLD AUTO: 0.01 K/UL (ref 0–0.11)
IMM GRANULOCYTES NFR BLD AUTO: 0.1 % (ref 0–0.9)
LYMPHOCYTES # BLD AUTO: 2.09 K/UL (ref 1–4.8)
LYMPHOCYTES NFR BLD: 30.5 % (ref 22–41)
MCH RBC QN AUTO: 31.4 PG (ref 27–33)
MCHC RBC AUTO-ENTMCNC: 32.7 G/DL (ref 33.6–35)
MCV RBC AUTO: 96 FL (ref 81.4–97.8)
MONOCYTES # BLD AUTO: 0.43 K/UL (ref 0–0.85)
MONOCYTES NFR BLD AUTO: 6.3 % (ref 0–13.4)
NEUTROPHILS # BLD AUTO: 4.17 K/UL (ref 2–7.15)
NEUTROPHILS NFR BLD: 60.8 % (ref 44–72)
NRBC # BLD AUTO: 0 K/UL
NRBC BLD-RTO: 0 /100 WBC
PLATELET # BLD AUTO: 242 K/UL (ref 164–446)
PMV BLD AUTO: 10.9 FL (ref 9–12.9)
POTASSIUM SERPL-SCNC: 3.8 MMOL/L (ref 3.6–5.5)
PROT SERPL-MCNC: 6.7 G/DL (ref 6–8.2)
RBC # BLD AUTO: 4.81 M/UL (ref 4.2–5.4)
SODIUM SERPL-SCNC: 140 MMOL/L (ref 135–145)
WBC # BLD AUTO: 6.9 K/UL (ref 4.8–10.8)

## 2018-11-20 PROCEDURE — 36415 COLL VENOUS BLD VENIPUNCTURE: CPT

## 2018-11-20 PROCEDURE — 80053 COMPREHEN METABOLIC PANEL: CPT

## 2018-11-20 PROCEDURE — 83880 ASSAY OF NATRIURETIC PEPTIDE: CPT

## 2018-11-20 PROCEDURE — 85025 COMPLETE CBC W/AUTO DIFF WBC: CPT

## 2019-01-03 DIAGNOSIS — R60.9 PERIPHERAL EDEMA: ICD-10-CM

## 2019-01-04 NOTE — TELEPHONE ENCOUNTER
Was the patient seen in the last year in this department? Yes    Does patient have an active prescription for medications requested? No     Received Request Via: Pharmacy      Pt met protocol?: Yes    Pt last ov 11/2018   BP Readings from Last 1 Encounters:   11/15/18 126/72

## 2019-01-06 RX ORDER — FUROSEMIDE 20 MG/1
TABLET ORAL
Qty: 60 TAB | Refills: 2 | Status: SHIPPED | OUTPATIENT
Start: 2019-01-06 | End: 2019-02-22 | Stop reason: SDUPTHER

## 2019-02-11 ENCOUNTER — OFFICE VISIT (OUTPATIENT)
Dept: URGENT CARE | Facility: CLINIC | Age: 54
End: 2019-02-11
Payer: COMMERCIAL

## 2019-02-11 VITALS
SYSTOLIC BLOOD PRESSURE: 106 MMHG | BODY MASS INDEX: 34.23 KG/M2 | HEART RATE: 64 BPM | RESPIRATION RATE: 12 BRPM | HEIGHT: 66 IN | DIASTOLIC BLOOD PRESSURE: 54 MMHG | TEMPERATURE: 98.9 F | OXYGEN SATURATION: 100 % | WEIGHT: 213 LBS

## 2019-02-11 DIAGNOSIS — B35.1 ONYCHOMYCOSIS: ICD-10-CM

## 2019-02-11 PROCEDURE — 99214 OFFICE O/P EST MOD 30 MIN: CPT | Performed by: PHYSICIAN ASSISTANT

## 2019-02-12 ASSESSMENT — ENCOUNTER SYMPTOMS
CHILLS: 0
HEADACHES: 0
WEAKNESS: 0
NAUSEA: 0
COUGH: 0
VOMITING: 0
NUMBNESS: 0
MYALGIAS: 0
FEVER: 0

## 2019-02-12 NOTE — PROGRESS NOTES
Subjective:      Keiko Burgos is a 53 y.o. female who presents with Nail Problem (nails are a black color and also painful x 3 weeks )            Nail Problem   This is a new problem. The current episode started 1 to 4 weeks ago. The problem occurs constantly. The problem has been gradually worsening. Pertinent negatives include no chest pain, chills, coughing, fever, headaches, myalgias, nausea, numbness, vomiting or weakness. Treatments tried: OTC antifungal. The treatment provided mild relief.   Patient has history of onychomycosis of her feet.  It has since spread to her fingernails.  They are hard, yellow.  There is some Flores and some area there.      PMH:  has a past medical history of Anemia; Depression with anxiety; Fibromyalgia; Guillain-Morrison (HCC) (4//2015); H/O bladder infections; H/O infectious mononucleosis; H/O meningitis; H/O: pneumonia; and Migraines.  MEDS:   Current Outpatient Prescriptions:   •  Efinaconazole 10 % Solution, 1 Application by Apply externally route every day. X 48 weeks, Disp: 1 Bottle, Rfl: 6  •  furosemide (LASIX) 20 MG Tab, TAKE 1 TABLET BY MOUTH TWICE DAILY, Disp: 60 Tab, Rfl: 2  •  MAGNESIUM GLUCONATE PO, Take  by mouth., Disp: , Rfl:   •  citalopram (CELEXA) 40 MG Tab, Take 40 mg by mouth every day., Disp: , Rfl:   •  gabapentin (NEURONTIN) 400 MG Cap, Take 400 mg by mouth every day., Disp: , Rfl:   •  B Complex-C (B-COMPLEX WITH VITAMIN C) tablet, Take 1 Tab by mouth every evening., Disp: , Rfl:   •  Coenzyme Q10 (CO Q 10 PO), Take 1 Cap by mouth every evening., Disp: , Rfl:   •  nabumetone (RELAFEN) 500 MG Tab, Take 500 mg by mouth 2 times a day as needed for Moderate Pain., Disp: , Rfl:   •  albuterol 108 (90 BASE) MCG/ACT Aero Soln inhalation aerosol, Inhale 2 Puffs by mouth every 6 hours as needed for Shortness of Breath. (Patient not taking: Reported on 2/11/2019), Disp: 8.5 g, Rfl: 0  ALLERGIES:   Allergies   Allergen Reactions   • Influenza Vaccines  "Unspecified     Hx of [facility]   • Keflex Unspecified     Hx of [facility]   • Other Drug Unspecified     Pneumonia vaccine. Hx of [facility]   • Rocephin [Ceftriaxone Sodium-Lidocaine] Unspecified     PT WILL NOT TAKE MEDICATION. NOT ALLERGIC.   • Sulfa Drugs Unspecified     Hx of [facility]   • Tape Unspecified     Blister. Paper tape ok per patient   • Tetanus Toxoids Unspecified     Hx of [facility]     SURGHX:   Past Surgical History:   Procedure Laterality Date   • LUMBAR DECOMPRESSION  2004   • ABDOMINAL HYSTERECTOMY TOTAL      fibroid, supracervical   • OOPHORECTOMY      rt side only removed     SOCHX:  reports that she quit smoking about 29 years ago. She has never used smokeless tobacco. She reports that she drinks alcohol. She reports that she uses drugs, including Marijuana.  FH: family history includes Cancer in her maternal grandmother and paternal grandmother; Heart Disease in her father and mother.    Review of Systems   Constitutional: Negative for chills and fever.   Respiratory: Negative for cough.    Cardiovascular: Negative for chest pain.   Gastrointestinal: Negative for nausea and vomiting.   Musculoskeletal: Negative for myalgias.   Neurological: Negative for weakness, numbness and headaches.       Medications, Allergies, and current problem list reviewed today in Epic     Objective:     /54   Pulse 64   Temp 37.2 °C (98.9 °F)   Resp 12   Ht 1.676 m (5' 6\")   Wt 96.6 kg (213 lb)   SpO2 100%   BMI 34.38 kg/m²      Physical Exam   Constitutional: She is oriented to person, place, and time. She appears well-developed and well-nourished. No distress.   HENT:   Head: Normocephalic and atraumatic.   Eyes: Conjunctivae and EOM are normal.   Neck: Normal range of motion. Neck supple.   Cardiovascular: Normal rate, regular rhythm and normal heart sounds.    Pulmonary/Chest: Effort normal and breath sounds normal. No respiratory distress. She has no wheezes. "   Neurological: She is alert and oriented to person, place, and time.   Skin: Skin is warm and dry. She is not diaphoretic.   Hardening/thickening of all fingernails.  Yellowing noted.  Some brown spots on her thumb nails.  No open lesions or discharge.   Psychiatric: She has a normal mood and affect. Her behavior is normal. Judgment and thought content normal.   Nursing note and vitals reviewed.              Assessment/Plan:     1. Onychomycosis  Efinaconazole 10 % Solution     We will attempt topical treatment.  Use solution daily until resolved, up to 48 weeks.  If no improvement she will follow-up with PCP for possible oral therapy.  OTC meds and conservative measures as discussed  Return to clinic or go to ED if symptoms worsen or persist. Indications for ED discussed at length. Patient voices understanding. Follow-up with your primary care provider in 3-5 days. Red flags discussed. All side effects of medication discussed including allergic response, GI upset, tendon injury, etc.    Please note that this dictation was created using voice recognition software. I have made every reasonable attempt to correct obvious errors, but I expect that there are errors of grammar and possibly content that I did not discover before finalizing the note.

## 2019-02-13 DIAGNOSIS — Z00.6 RESEARCH STUDY PATIENT: ICD-10-CM

## 2019-02-14 ENCOUNTER — TELEPHONE (OUTPATIENT)
Dept: ADMINISTRATIVE | Facility: MEDICAL CENTER | Age: 54
End: 2019-02-14

## 2019-02-14 NOTE — TELEPHONE ENCOUNTER
[facility] [place] [facility] Calcium CT Trial: Felipe Zaragoza, study team member, contacted  via phone for enrollment in the above stated trial. Informed Consent form reviewed, questions answered. Consent signed via Hello Sign on 2/13/2019 by participant and on 2/13/19 by Felipe Zaragoza. Order for CT scan placed in Epic.

## 2019-02-22 ENCOUNTER — OFFICE VISIT (OUTPATIENT)
Dept: MEDICAL GROUP | Facility: PHYSICIAN GROUP | Age: 54
End: 2019-02-22
Payer: COMMERCIAL

## 2019-02-22 VITALS
BODY MASS INDEX: 33.43 KG/M2 | HEIGHT: 66 IN | TEMPERATURE: 98.3 F | HEART RATE: 64 BPM | SYSTOLIC BLOOD PRESSURE: 118 MMHG | DIASTOLIC BLOOD PRESSURE: 58 MMHG | WEIGHT: 208 LBS | OXYGEN SATURATION: 96 %

## 2019-02-22 DIAGNOSIS — F41.8 DEPRESSION WITH ANXIETY: Chronic | ICD-10-CM

## 2019-02-22 DIAGNOSIS — E66.9 CLASS 1 OBESITY WITH BODY MASS INDEX (BMI) OF 33.0 TO 33.9 IN ADULT, UNSPECIFIED OBESITY TYPE, UNSPECIFIED WHETHER SERIOUS COMORBIDITY PRESENT: ICD-10-CM

## 2019-02-22 DIAGNOSIS — E66.9 OBESITY (BMI 30-39.9): ICD-10-CM

## 2019-02-22 DIAGNOSIS — R60.9 PERIPHERAL EDEMA: ICD-10-CM

## 2019-02-22 DIAGNOSIS — B35.1 ONYCHOMYCOSIS: ICD-10-CM

## 2019-02-22 DIAGNOSIS — Z96.22 S/P TYMPANOSTOMY TUBE PLACEMENT: ICD-10-CM

## 2019-02-22 PROCEDURE — 99214 OFFICE O/P EST MOD 30 MIN: CPT | Performed by: INTERNAL MEDICINE

## 2019-02-22 RX ORDER — FUROSEMIDE 20 MG/1
20 TABLET ORAL DAILY
Qty: 60 TAB | Refills: 2 | Status: SHIPPED | OUTPATIENT
Start: 2019-02-22 | End: 2019-08-17 | Stop reason: SDUPTHER

## 2019-02-22 RX ORDER — TERBINAFINE HYDROCHLORIDE 250 MG/1
250 TABLET ORAL DAILY
Qty: 28 TAB | Refills: 0 | Status: SHIPPED | OUTPATIENT
Start: 2019-02-22 | End: 2019-05-24

## 2019-02-22 ASSESSMENT — PATIENT HEALTH QUESTIONNAIRE - PHQ9: CLINICAL INTERPRETATION OF PHQ2 SCORE: 0

## 2019-02-23 NOTE — ASSESSMENT & PLAN NOTE
This is a chronic health problem that is well controlled with current medications and lifestyle measures. Previous placement in 2017 but had it placed again recently on 02/04/2019 with ENT .

## 2019-02-23 NOTE — PROGRESS NOTES
CC: Follow-up visit, onychomycosis.    HISTORY OF PRESENT ILLNESS: Patient is a 53 y.o. female established patient who presents today to discuss her medical conditions as mentioned in HPI below.    Health Maintenance: Completed    Depression with anxiety  This is a chronic health problem that is well controlled with current medications and lifestyle measures. On Celexa 40 mg daily.Denies any SI/HI.    Onychomycosis  New problem , started 1 month back. Has gotten topical applicants which insurance not covering. Pt requesting for help. Found in all the toes and all the fingers. More in the thumbs.    S/P tympanostomy tube placement  This is a chronic health problem that is well controlled with current medications and lifestyle measures. Previous placement in 2017 but had it placed again recently on 02/04/2019 with ENT .    Peripheral edema  This is a chronic health problem that is well controlled with current medications and lifestyle measures. Has been taking Lasix 40 mg once a day. Feeling improved but didn't resolve.      PHQ score 0, BMI > 33 , former tobacco, no fall injuries.    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 60 Tab 2   • terbinafine (LAMISIL) 250 MG Tab Take 1 Tab by mouth every day. 28 Tab 0   • MAGNESIUM GLUCONATE PO Take  by mouth.     • citalopram (CELEXA) 40 MG Tab Take 40 mg by mouth every day.     • gabapentin (NEURONTIN) 400 MG Cap Take 400 mg by mouth every day.     • B Complex-C (B-COMPLEX WITH VITAMIN C) tablet Take 1 Tab by mouth every evening.     • Coenzyme Q10 (CO Q 10 PO) Take 1 Cap by mouth every evening.     • nabumetone (RELAFEN) 500 MG Tab Take 500 mg by mouth 2 times a day as needed for Moderate Pain.     • Efinaconazole 10 % Solution 1 Application by Apply externally route every day. X 48 weeks 1 Bottle 6   • albuterol 108 (90 BASE) MCG/ACT Aero Soln inhalation aerosol Inhale 2 Puffs by mouth every 6 hours as needed for Shortness of Breath. 8.5 g 0     No current facility-administered medications for this visit.        Social History   Substance Use Topics   • Smoking status: Former Smoker     Quit date: 11/19/1989   • Smokeless tobacco: Never Used      Comment: avoid all tobacco products   • Alcohol use 0.0 oz/week      Comment: rarely     Social History     Social History Narrative   • No narrative on file       Family History   Problem Relation Age of Onset   • Heart Disease Mother    • Heart Disease Father    • Cancer Maternal Grandmother         colon    • Cancer Paternal Grandmother         ovarian        ROS:     - Constitutional:  Negative for fever, chills, unexpected weight change, and fatigue/generalized weakness.    - HEENT:  Negative for headaches, vision changes, hearing changes, ear pain, ear discharge, rhinorrhea, sinus congestion, sore throat, and neck pain.      - Respiratory: Negative for cough, sputum production, chest congestion, dyspnea, wheezing, and crackles.      - Cardiovascular:  "Negative for chest pain, palpitations, orthopnea, and bilateral lower extremity edema.     - Gastrointestinal: Negative for heartburn, nausea, vomiting, abdominal pain, hematochezia, melena, diarrhea, constipation, and greasy/foul-smelling stools.     - Genitourinary: Negative for dysuria, polyuria, hematuria, pyuria, urinary urgency, and urinary incontinence.     - Musculoskeletal: Negative for myalgias, back pain, and joint pain.     - Skin: As mentioned in HPI above Negative for rash, itching, cyanotic skin color change.     - Neurological: Negative for dizziness, tingling, tremors, focal sensory deficit, focal weakness and headaches.     - Endo/Heme/Allergies: Does not bruise/bleed easily.     - Psychiatric/Behavioral: Negative for depression, suicidal/homicidal ideation and memory loss.       Last lab work in 11/2018 reviewed and discussed with the patient       Exam:    Blood pressure 118/58, pulse 64, temperature 36.8 °C (98.3 °F), temperature source Temporal, height 1.676 m (5' 6\"), weight 94.3 kg (208 lb), SpO2 96 %, not currently breastfeeding. Body mass index is 33.57 kg/m².    General:  Well nourished, well developed female in NAD  Head is grossly normal.  ENT exam : Ears benign, No sinus tenderness on palpation. No pharyngeal erythema or Cervical lymphadenopathy.Neck: Supple without JVD or bruit. Thyroid is not enlarged.  Pulmonary: Clear to ausculation and percussion.  Normal effort. No rales, ronchi, or wheezing.  Cardiovascular: Regular rate and rhythm without murmur. Carotid and radial pulses are intact and equal bilaterally.  Extremities: no clubbing, cyanosis, or edema.Positive for Onychomycosis on both upper and lower extremities.    Please note that this dictation was created using voice recognition software. I have made every reasonable attempt to correct obvious errors, but I expect that there are errors of grammar and possibly content that I did not discover before finalizing the " note.    Assessment/Plan:  1. Depression with anxiety  Well controlled, continue current Celexa 40 mg daily.    2. Peripheral edema  Improving, not resolved yet.  Continue current Lasix 20 mg daily.  - furosemide (LASIX) 20 MG Tab; Take 1 Tab by mouth every day.  Dispense: 60 Tab; Refill: 2    3. Onychomycosis  New problem, have discussed at length that she will need systemic treatment as she has both upper and lower extremity involvement and the topical ones will take a long time to get it healed.  Patient opted for oral therapy after confirming that her liver functions were normal in the recent lab work.  Will recheck the LFTs in her upcoming visit.  - terbinafine (LAMISIL) 250 MG Tab; Take 1 Tab by mouth every day.  Dispense: 28 Tab; Refill: 0    4. S/P tympanostomy tube placement  Stable, continue to follow-up with ENT.    5. Obesity (BMI 30-39.9)  6. Class 1 obesity with body mass index (BMI) of 33.0 to 33.9 in adult, unspecified obesity type, unspecified whether serious comorbidity present  Pt educated on the increase of morbidity and mortality associated with excess weight including DM, Heart Disease, HTN, stroke, and sleep apnea.  Pt advised weight loss of 5% through reduced calorie, low fat diet and 150 mins of exercise a week  Recommend obesity clinic if patient is unsuccessful with weight loss

## 2019-02-23 NOTE — ASSESSMENT & PLAN NOTE
New problem , started 1 month back. Has gotten topical applicants which insurance not covering. Pt requesting for help. Found in all the toes and all the fingers. More in the thumbs.

## 2019-02-23 NOTE — ASSESSMENT & PLAN NOTE
This is a chronic health problem that is well controlled with current medications and lifestyle measures. Has been taking Lasix 40 mg once a day. Feeling improved but didn't resolve.

## 2019-02-23 NOTE — ASSESSMENT & PLAN NOTE
This is a chronic health problem that is well controlled with current medications and lifestyle measures. On Celexa 40 mg daily.Denies any SI/HI.

## 2019-03-04 ENCOUNTER — HOSPITAL ENCOUNTER (OUTPATIENT)
Dept: RADIOLOGY | Facility: MEDICAL CENTER | Age: 54
End: 2019-03-04
Attending: INTERNAL MEDICINE

## 2019-03-04 DIAGNOSIS — Z00.6 RESEARCH STUDY PATIENT: ICD-10-CM

## 2019-03-04 PROCEDURE — 4410556 CT-CARDIAC SCORING

## 2019-03-26 ENCOUNTER — TELEPHONE (OUTPATIENT)
Dept: CARDIOLOGY | Facility: MEDICAL CENTER | Age: 54
End: 2019-03-26

## 2019-03-26 NOTE — TELEPHONE ENCOUNTER
[facility] [place] Cardiac Calcium Scoring CT study: Spoke with Ms. Burgos today,   3-26-19 via phone. Results of CT as reviewed by Dr. Plasencia discussed with her.     Findings:     Coronary calcification:  LMA - 0  LCX - 0  LAD - 0  RCA - 0  PDA - 0  Calcium score: 0    IMPRESSION: Consider repeating the test in 5 years    Calcium score is below the 25th percentile for the patient’s age and sex.      Study team contact information provided, encouraged to follow up with PCP.

## 2019-04-05 PROBLEM — Z00.6 RESEARCH STUDY PATIENT: Status: RESOLVED | Noted: 2019-02-13 | Resolved: 2019-04-05

## 2019-05-24 ENCOUNTER — OFFICE VISIT (OUTPATIENT)
Dept: MEDICAL GROUP | Facility: PHYSICIAN GROUP | Age: 54
End: 2019-05-24
Payer: COMMERCIAL

## 2019-05-24 VITALS
BODY MASS INDEX: 33.91 KG/M2 | HEIGHT: 66 IN | SYSTOLIC BLOOD PRESSURE: 118 MMHG | DIASTOLIC BLOOD PRESSURE: 62 MMHG | TEMPERATURE: 98.7 F | WEIGHT: 211 LBS | HEART RATE: 72 BPM | OXYGEN SATURATION: 97 %

## 2019-05-24 DIAGNOSIS — F41.8 DEPRESSION WITH ANXIETY: Chronic | ICD-10-CM

## 2019-05-24 DIAGNOSIS — E66.9 CLASS 1 OBESITY WITH BODY MASS INDEX (BMI) OF 34.0 TO 34.9 IN ADULT, UNSPECIFIED OBESITY TYPE, UNSPECIFIED WHETHER SERIOUS COMORBIDITY PRESENT: ICD-10-CM

## 2019-05-24 DIAGNOSIS — B35.1 ONYCHOMYCOSIS: ICD-10-CM

## 2019-05-24 DIAGNOSIS — Z12.31 ENCOUNTER FOR SCREENING MAMMOGRAM FOR BREAST CANCER: ICD-10-CM

## 2019-05-24 DIAGNOSIS — E66.9 OBESITY (BMI 30-39.9): ICD-10-CM

## 2019-05-24 DIAGNOSIS — G47.33 OSA (OBSTRUCTIVE SLEEP APNEA): ICD-10-CM

## 2019-05-24 PROCEDURE — 99214 OFFICE O/P EST MOD 30 MIN: CPT | Performed by: INTERNAL MEDICINE

## 2019-05-24 RX ORDER — TERBINAFINE HYDROCHLORIDE 250 MG/1
250 TABLET ORAL DAILY
Qty: 28 TAB | Refills: 0 | Status: SHIPPED | OUTPATIENT
Start: 2019-05-24 | End: 2019-05-24 | Stop reason: SDUPTHER

## 2019-05-24 RX ORDER — TERBINAFINE HYDROCHLORIDE 250 MG/1
250 TABLET ORAL DAILY
Qty: 28 TAB | Refills: 1 | Status: SHIPPED | OUTPATIENT
Start: 2019-05-24 | End: 2023-07-16

## 2019-05-24 NOTE — ASSESSMENT & PLAN NOTE
This is a chronic health problem that is well controlled with current medications and lifestyle measures. Pt had this diagnosis before through workers compensation in 2012 some times. She is on a Oxygen supply at 1.5 L Oxygen in the nights.  ( Pt had Nocturnal Oximetry studies qualified it for her ) . Pt only uses in brewer at allergy seasons but not everyday.

## 2019-05-24 NOTE — ASSESSMENT & PLAN NOTE
This is a chronic health problem that is uncontrolled with current medications and lifestyle measures. Pt says she has been improving with the current topical application and also terbinafine oral medication and requests for refills.

## 2019-05-24 NOTE — ASSESSMENT & PLAN NOTE
This is a chronic health problem that is well controlled with current medications and lifestyle measures.  Currently on Celexa 40 mg daily.

## 2019-05-24 NOTE — PROGRESS NOTES
CC: Follow-up visit, onychomycosis.    HISTORY OF PRESENT ILLNESS: Patient is a 53 y.o. female established patient who presents today to discuss her medical conditions as mentioned in HPI below.    Health Maintenance: Due for mammogram okay to get in our office visit the orders.    STAN (obstructive sleep apnea)  This is a chronic health problem that is well controlled with current medications and lifestyle measures. Pt had this diagnosis before through workers compensation in 2012 some times. She is on a Oxygen supply at 1.5 L Oxygen in the nights.  ( Pt had Nocturnal Oximetry studies qualified it for her ) . Pt only uses in brewer at allergy seasons but not everyday.     Onychomycosis  This is a chronic health problem that is uncontrolled with current medications and lifestyle measures. Pt says she has been improving with the current topical application and also terbinafine oral medication and requests for refills.    Depression with anxiety  This is a chronic health problem that is well controlled with current medications and lifestyle measures.  Currently on Celexa 40 mg daily.      PHQ score 0, BMI > 34 , former tobacco, no fall injuries.    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 X 48 weeks 1 Bottle 6   • terbinafine (LAMISIL) 250 MG Tab Take 1 Tab by mouth every day. 28 Tab 1   • furosemide (LASIX) 20 MG Tab Take 1 Tab by mouth every day. 60 Tab 2   • MAGNESIUM GLUCONATE PO Take  by mouth.     • citalopram (CELEXA) 40 MG Tab Take 40 mg by mouth every day.     • gabapentin (NEURONTIN) 400 MG Cap Take 400 mg by mouth every day.     • B Complex-C (B-COMPLEX WITH VITAMIN C) tablet Take 1 Tab by mouth every evening.     • Coenzyme Q10 (CO Q 10 PO) Take 1 Cap by mouth every evening.     • nabumetone (RELAFEN) 500 MG Tab Take 500 mg by mouth 2 times a day as needed for Moderate Pain.     • albuterol 108 (90 BASE) MCG/ACT Aero Soln inhalation aerosol Inhale 2 Puffs by mouth every 6 hours as needed for Shortness of Breath. 8.5 g 0     No current facility-administered medications for this visit.        Social History   Substance Use Topics   • Smoking status: Former Smoker     Quit date: 11/19/1989   • Smokeless tobacco: Never Used      Comment: avoid all tobacco products   • Alcohol use 0.0 oz/week      Comment: rarely     Social History     Social History Narrative   • No narrative on file       Family History   Problem Relation Age of Onset   • Heart Disease Mother    • Heart Disease Father    • Cancer Maternal Grandmother         colon    • Cancer Paternal Grandmother         ovarian        ROS:     - Constitutional:  Negative for fever, chills, unexpected weight change, and fatigue/generalized weakness.    - HEENT:  Negative for headaches, vision changes, hearing changes, ear pain, ear discharge, rhinorrhea, sinus congestion, sore throat, and neck pain.      - Respiratory: Negative for cough, sputum production, chest congestion, dyspnea, wheezing, and crackles.      -  "Cardiovascular: Negative for chest pain, palpitations, orthopnea, and bilateral lower extremity edema.     - Gastrointestinal: Negative for heartburn, nausea, vomiting, abdominal pain, hematochezia, melena, diarrhea, constipation, and greasy/foul-smelling stools.     - Genitourinary: Negative for dysuria, polyuria, hematuria, pyuria, urinary urgency, and urinary incontinence.     - Musculoskeletal: Negative for myalgias, back pain, and joint pain.     - Skin: Negative for rash, itching, cyanotic skin color change.     - Neurological: Negative for dizziness, tingling, tremors, focal sensory deficit, focal weakness and headaches.     - Endo/Heme/Allergies: Does not bruise/bleed easily.     - Psychiatric/Behavioral: Negative for depression, suicidal/homicidal ideation and memory loss.      Last lab work in November 2018 reviewed and discussed with patient.      Exam:    /62 (BP Location: Right arm, Patient Position: Sitting, BP Cuff Size: Adult)   Pulse 72   Temp 37.1 °C (98.7 °F) (Temporal)   Ht 1.676 m (5' 6\")   Wt 95.7 kg (211 lb)   SpO2 97%  Body mass index is 34.06 kg/m².    General:  Well nourished, well developed female in NAD  Head is grossly normal.  Neck: Supple without JVD or bruit. Thyroid is not enlarged.  Pulmonary: Clear to ausculation and percussion.  Normal effort. No rales, ronchi, or wheezing.  Cardiovascular: Regular rate and rhythm without murmur. Carotid and radial pulses are intact and equal bilaterally.  Extremities: no clubbing, cyanosis, or edema.  Hand fingernails : Positive for ingrowing thumbnails and hyperkeratosis, I could not examine it properly as patient was wearing nail polish.  Given the hyperkeratotic skin beneath the nails I believe that it still needs some more treatment.    Please note that this dictation was created using voice recognition software. I have made every reasonable attempt to correct obvious errors, but I expect that there are errors of grammar and " possibly content that I did not discover before finalizing the note.    Assessment/Plan:  1. STAN (obstructive sleep apnea)  Stable, patient states that she has underwent sleep studies in the past for which she was recommended nocturnal oxygen and she has a machine for her own, uses only as needed.  Will need to get the records for this.    2. Obesity (BMI 30-39.9)  3. Class 1 obesity with body mass index (BMI) of 34.0 to 34.9 in adult, unspecified obesity type, unspecified whether serious comorbidity present  Pt educated on the increase of morbidity and mortality associated with excess weight including DM, Heart Disease, HTN, stroke, and sleep apnea.  Pt advised weight loss of 5% through reduced calorie, low fat diet and 150 mins of exercise a week  Recommend obesity clinic if patient is unsuccessful with weight loss  - Patient identified as having weight management issue.  Appropriate orders and counseling given.    4. Onychomycosis  Ongoing problem, will refill the medications as requested.  After this completion of course I will send her to podiatry as this will be unresponding onychomycosis to medical therapy.  We will check LFTs at this time.  - Efinaconazole 10 % Solution; 1 Application by Apply externally route every day. X 48 weeks  Dispense: 1 Bottle; Refill: 6  - terbinafine (LAMISIL) 250 MG Tab; Take 1 Tab by mouth every day.  Dispense: 28 Tab; Refill: 1  - Comp Metabolic Panel; Future    5. Encounter for screening mammogram for breast cancer  - MA-SCREENING MAMMO BILAT W/TOMOSYNTHESIS W/CAD; Future

## 2019-06-05 ENCOUNTER — TELEPHONE (OUTPATIENT)
Dept: MEDICAL GROUP | Facility: PHYSICIAN GROUP | Age: 54
End: 2019-06-05

## 2019-06-05 RX ORDER — CICLOPIROX 80 MG/ML
SOLUTION TOPICAL
Qty: 6.6 ML | Refills: 4 | Status: SHIPPED | OUTPATIENT
Start: 2019-06-05 | End: 2023-07-16

## 2019-06-05 NOTE — TELEPHONE ENCOUNTER
Fax from [facility]. Jublia is not covered by insurance, insurance prefers ciclopirox topical solution. Thank you.

## 2019-06-05 NOTE — TELEPHONE ENCOUNTER
As per my note that was patient preferance . Please call the patient and let her know the insurance issue. I have changed it to Ciclopirox. Thank you.  Celeste Green M.D.

## 2019-08-17 DIAGNOSIS — R60.9 PERIPHERAL EDEMA: ICD-10-CM

## 2019-08-19 RX ORDER — FUROSEMIDE 20 MG/1
TABLET ORAL
Qty: 90 TAB | Refills: 1 | Status: SHIPPED | OUTPATIENT
Start: 2019-08-19 | End: 2023-07-16

## 2019-08-19 NOTE — TELEPHONE ENCOUNTER
Refill X 6 months, sent to pharmacy.Pt. Seen in the last 6 months per protocol.   Lab Results   Component Value Date/Time    SODIUM 140 11/20/2018 07:48 AM    POTASSIUM 3.8 11/20/2018 07:48 AM    CHLORIDE 103 11/20/2018 07:48 AM    CO2 30 11/20/2018 07:48 AM    GLUCOSE 82 11/20/2018 07:48 AM    BUN 14 11/20/2018 07:48 AM    CREATININE 1.08 11/20/2018 07:48 AM

## 2019-10-07 ENCOUNTER — HOSPITAL ENCOUNTER (OUTPATIENT)
Dept: LAB | Facility: MEDICAL CENTER | Age: 54
End: 2019-10-07
Attending: FAMILY MEDICINE
Payer: COMMERCIAL

## 2019-10-07 LAB
25(OH)D3 SERPL-MCNC: 21 NG/ML (ref 30–100)
ALBUMIN SERPL BCP-MCNC: 4 G/DL (ref 3.2–4.9)
ALBUMIN/GLOB SERPL: 1.7 G/DL
ALP SERPL-CCNC: 72 U/L (ref 30–99)
ALT SERPL-CCNC: 17 U/L (ref 2–50)
ANION GAP SERPL CALC-SCNC: 6 MMOL/L (ref 0–11.9)
AST SERPL-CCNC: 15 U/L (ref 12–45)
BASOPHILS # BLD AUTO: 0.7 % (ref 0–1.8)
BASOPHILS # BLD: 0.05 K/UL (ref 0–0.12)
BILIRUB SERPL-MCNC: 0.7 MG/DL (ref 0.1–1.5)
BUN SERPL-MCNC: 14 MG/DL (ref 8–22)
CALCIUM SERPL-MCNC: 8.3 MG/DL (ref 8.5–10.5)
CHLORIDE SERPL-SCNC: 109 MMOL/L (ref 96–112)
CO2 SERPL-SCNC: 26 MMOL/L (ref 20–33)
CREAT SERPL-MCNC: 0.94 MG/DL (ref 0.5–1.4)
EOSINOPHIL # BLD AUTO: 0.11 K/UL (ref 0–0.51)
EOSINOPHIL NFR BLD: 1.5 % (ref 0–6.9)
ERYTHROCYTE [DISTWIDTH] IN BLOOD BY AUTOMATED COUNT: 44.1 FL (ref 35.9–50)
FASTING STATUS PATIENT QL REPORTED: NORMAL
GLOBULIN SER CALC-MCNC: 2.3 G/DL (ref 1.9–3.5)
GLUCOSE SERPL-MCNC: 82 MG/DL (ref 65–99)
HCT VFR BLD AUTO: 43.1 % (ref 37–47)
HGB BLD-MCNC: 14.2 G/DL (ref 12–16)
IMM GRANULOCYTES # BLD AUTO: 0.02 K/UL (ref 0–0.11)
IMM GRANULOCYTES NFR BLD AUTO: 0.3 % (ref 0–0.9)
LYMPHOCYTES # BLD AUTO: 2.44 K/UL (ref 1–4.8)
LYMPHOCYTES NFR BLD: 34 % (ref 22–41)
MCH RBC QN AUTO: 31.1 PG (ref 27–33)
MCHC RBC AUTO-ENTMCNC: 32.9 G/DL (ref 33.6–35)
MCV RBC AUTO: 94.3 FL (ref 81.4–97.8)
MONOCYTES # BLD AUTO: 0.45 K/UL (ref 0–0.85)
MONOCYTES NFR BLD AUTO: 6.3 % (ref 0–13.4)
NEUTROPHILS # BLD AUTO: 4.11 K/UL (ref 2–7.15)
NEUTROPHILS NFR BLD: 57.2 % (ref 44–72)
NRBC # BLD AUTO: 0 K/UL
NRBC BLD-RTO: 0 /100 WBC
PLATELET # BLD AUTO: 233 K/UL (ref 164–446)
PMV BLD AUTO: 11.3 FL (ref 9–12.9)
POTASSIUM SERPL-SCNC: 3.9 MMOL/L (ref 3.6–5.5)
PROT SERPL-MCNC: 6.3 G/DL (ref 6–8.2)
RBC # BLD AUTO: 4.57 M/UL (ref 4.2–5.4)
SODIUM SERPL-SCNC: 141 MMOL/L (ref 135–145)
T3FREE SERPL-MCNC: 3.52 PG/ML (ref 2.4–4.2)
T4 FREE SERPL-MCNC: 1.15 NG/DL (ref 0.53–1.43)
TSH SERPL DL<=0.005 MIU/L-ACNC: 0.81 UIU/ML (ref 0.38–5.33)
WBC # BLD AUTO: 7.2 K/UL (ref 4.8–10.8)

## 2019-10-07 PROCEDURE — 84481 FREE ASSAY (FT-3): CPT

## 2019-10-07 PROCEDURE — 83525 ASSAY OF INSULIN: CPT

## 2019-10-07 PROCEDURE — 85025 COMPLETE CBC W/AUTO DIFF WBC: CPT

## 2019-10-07 PROCEDURE — 82306 VITAMIN D 25 HYDROXY: CPT

## 2019-10-07 PROCEDURE — 83704 LIPOPROTEIN BLD QUAN PART: CPT

## 2019-10-07 PROCEDURE — 80061 LIPID PANEL: CPT

## 2019-10-07 PROCEDURE — 36415 COLL VENOUS BLD VENIPUNCTURE: CPT

## 2019-10-07 PROCEDURE — 84443 ASSAY THYROID STIM HORMONE: CPT

## 2019-10-07 PROCEDURE — 80053 COMPREHEN METABOLIC PANEL: CPT

## 2019-10-07 PROCEDURE — 83036 HEMOGLOBIN GLYCOSYLATED A1C: CPT

## 2019-10-07 PROCEDURE — 84439 ASSAY OF FREE THYROXINE: CPT

## 2019-10-08 LAB
EST. AVERAGE GLUCOSE BLD GHB EST-MCNC: 108 MG/DL
HBA1C MFR BLD: 5.4 % (ref 0–5.6)

## 2019-10-09 LAB
FASTING STATUS PATIENT QL REPORTED: NORMAL
INSULIN P FAST SERPL-ACNC: 6 UIU/ML (ref 3–19)

## 2019-10-10 LAB
CHOLEST SERPL-MCNC: 186 MG/DL
FASTING STATUS PATIENT QL REPORTED: NORMAL
HDL PARTICAL NO Q4363: 29.3 UMOL/L
HDL SIZE Q4361: 8.5 NM
HDLC SERPL-MCNC: 39 MG/DL (ref 40–59)
HLD.LARGE SERPL-SCNC: <2.8 UMOL/L
L VLDL PART NO Q4357: <1.5 NMOL/L
LDL SERPL QN: 20.9 NM
LDL SERPL-SCNC: 1559 NMOL/L
LDL SMALL SERPL-SCNC: 764 NMOL/L
LDLC SERPL CALC-MCNC: 128 MG/DL
PATHOLOGY STUDY: ABNORMAL
TRIGL SERPL-MCNC: 94 MG/DL (ref 30–149)
VLDL SIZE Q4362: 45.5 NM

## 2019-12-05 ENCOUNTER — HOSPITAL ENCOUNTER (OUTPATIENT)
Dept: LAB | Facility: MEDICAL CENTER | Age: 54
End: 2019-12-05
Attending: PSYCHIATRY & NEUROLOGY
Payer: COMMERCIAL

## 2019-12-05 LAB
BASOPHILS # BLD AUTO: 0.3 % (ref 0–1.8)
BASOPHILS # BLD: 0.01 K/UL (ref 0–0.12)
EOSINOPHIL # BLD AUTO: 0.03 K/UL (ref 0–0.51)
EOSINOPHIL NFR BLD: 1 % (ref 0–6.9)
ERYTHROCYTE [DISTWIDTH] IN BLOOD BY AUTOMATED COUNT: 47.9 FL (ref 35.9–50)
ERYTHROCYTE [SEDIMENTATION RATE] IN BLOOD BY WESTERGREN METHOD: 4 MM/HOUR (ref 0–30)
HCT VFR BLD AUTO: 46.5 % (ref 37–47)
HGB BLD-MCNC: 15.3 G/DL (ref 12–16)
IMM GRANULOCYTES # BLD AUTO: 0.01 K/UL (ref 0–0.11)
IMM GRANULOCYTES NFR BLD AUTO: 0.3 % (ref 0–0.9)
LYMPHOCYTES # BLD AUTO: 1.47 K/UL (ref 1–4.8)
LYMPHOCYTES NFR BLD: 48.8 % (ref 22–41)
MCH RBC QN AUTO: 31.9 PG (ref 27–33)
MCHC RBC AUTO-ENTMCNC: 32.9 G/DL (ref 33.6–35)
MCV RBC AUTO: 97.1 FL (ref 81.4–97.8)
MONOCYTES # BLD AUTO: 0.22 K/UL (ref 0–0.85)
MONOCYTES NFR BLD AUTO: 7.3 % (ref 0–13.4)
NEUTROPHILS # BLD AUTO: 1.27 K/UL (ref 2–7.15)
NEUTROPHILS NFR BLD: 42.3 % (ref 44–72)
NRBC # BLD AUTO: 0 K/UL
NRBC BLD-RTO: 0 /100 WBC
PLATELET # BLD AUTO: 179 K/UL (ref 164–446)
PMV BLD AUTO: 11.5 FL (ref 9–12.9)
RBC # BLD AUTO: 4.79 M/UL (ref 4.2–5.4)
RHEUMATOID FACT SER IA-ACNC: <10 IU/ML (ref 0–14)
T3 SERPL-MCNC: 85.7 NG/DL (ref 60–181)
T4 FREE SERPL-MCNC: 0.75 NG/DL (ref 0.53–1.43)
T4 SERPL-MCNC: 8.9 UG/DL (ref 4–12)
TSH SERPL DL<=0.005 MIU/L-ACNC: 0.44 UIU/ML (ref 0.38–5.33)
URATE SERPL-MCNC: 5.3 MG/DL (ref 1.9–8.2)
WBC # BLD AUTO: 3 K/UL (ref 4.8–10.8)

## 2019-12-05 PROCEDURE — 86431 RHEUMATOID FACTOR QUANT: CPT

## 2019-12-05 PROCEDURE — 84439 ASSAY OF FREE THYROXINE: CPT

## 2019-12-05 PROCEDURE — 85025 COMPLETE CBC W/AUTO DIFF WBC: CPT

## 2019-12-05 PROCEDURE — 84443 ASSAY THYROID STIM HORMONE: CPT

## 2019-12-05 PROCEDURE — 85652 RBC SED RATE AUTOMATED: CPT

## 2019-12-05 PROCEDURE — 36415 COLL VENOUS BLD VENIPUNCTURE: CPT

## 2019-12-05 PROCEDURE — 86038 ANTINUCLEAR ANTIBODIES: CPT

## 2019-12-05 PROCEDURE — 84550 ASSAY OF BLOOD/URIC ACID: CPT

## 2019-12-05 PROCEDURE — 84480 ASSAY TRIIODOTHYRONINE (T3): CPT

## 2019-12-08 LAB — NUCLEAR IGG SER QL IA: NORMAL

## 2019-12-12 ENCOUNTER — HOSPITAL ENCOUNTER (OUTPATIENT)
Dept: RADIOLOGY | Facility: MEDICAL CENTER | Age: 54
End: 2019-12-12
Attending: FAMILY MEDICINE
Payer: COMMERCIAL

## 2019-12-12 DIAGNOSIS — G43.711 INTRACTABLE CHRONIC MIGRAINE WITHOUT AURA AND WITH STATUS MIGRAINOSUS: ICD-10-CM

## 2019-12-12 PROCEDURE — 700117 HCHG RX CONTRAST REV CODE 255

## 2019-12-12 PROCEDURE — A9576 INJ PROHANCE MULTIPACK: HCPCS

## 2019-12-12 PROCEDURE — 70553 MRI BRAIN STEM W/O & W/DYE: CPT

## 2019-12-12 RX ADMIN — GADOTERIDOL 18 ML: 279.3 INJECTION, SOLUTION INTRAVENOUS at 16:01

## 2020-10-31 ENCOUNTER — HOSPITAL ENCOUNTER (OUTPATIENT)
Dept: LAB | Facility: MEDICAL CENTER | Age: 55
End: 2020-10-31
Attending: SPECIALIST
Payer: COMMERCIAL

## 2020-10-31 LAB
25(OH)D3 SERPL-MCNC: 54 NG/ML (ref 30–100)
ALBUMIN SERPL BCP-MCNC: 4.1 G/DL (ref 3.2–4.9)
ALBUMIN/GLOB SERPL: 1.6 G/DL
ALP SERPL-CCNC: 86 U/L (ref 30–99)
ALT SERPL-CCNC: 15 U/L (ref 2–50)
ANION GAP SERPL CALC-SCNC: 11 MMOL/L (ref 7–16)
AST SERPL-CCNC: 16 U/L (ref 12–45)
BASOPHILS # BLD AUTO: 0.5 % (ref 0–1.8)
BASOPHILS # BLD: 0.03 K/UL (ref 0–0.12)
BILIRUB SERPL-MCNC: 0.9 MG/DL (ref 0.1–1.5)
BUN SERPL-MCNC: 16 MG/DL (ref 8–22)
CALCIUM SERPL-MCNC: 9.5 MG/DL (ref 8.5–10.5)
CHLORIDE SERPL-SCNC: 103 MMOL/L (ref 96–112)
CO2 SERPL-SCNC: 27 MMOL/L (ref 20–33)
CREAT SERPL-MCNC: 1.03 MG/DL (ref 0.5–1.4)
EOSINOPHIL # BLD AUTO: 0.07 K/UL (ref 0–0.51)
EOSINOPHIL NFR BLD: 1.2 % (ref 0–6.9)
ERYTHROCYTE [DISTWIDTH] IN BLOOD BY AUTOMATED COUNT: 43.3 FL (ref 35.9–50)
EST. AVERAGE GLUCOSE BLD GHB EST-MCNC: 108 MG/DL
GLOBULIN SER CALC-MCNC: 2.6 G/DL (ref 1.9–3.5)
GLUCOSE SERPL-MCNC: 84 MG/DL (ref 65–99)
HBA1C MFR BLD: 5.4 % (ref 0–5.6)
HCT VFR BLD AUTO: 45.4 % (ref 37–47)
HGB BLD-MCNC: 15.2 G/DL (ref 12–16)
IMM GRANULOCYTES # BLD AUTO: 0.01 K/UL (ref 0–0.11)
IMM GRANULOCYTES NFR BLD AUTO: 0.2 % (ref 0–0.9)
LYMPHOCYTES # BLD AUTO: 2.11 K/UL (ref 1–4.8)
LYMPHOCYTES NFR BLD: 35.1 % (ref 22–41)
MCH RBC QN AUTO: 31.7 PG (ref 27–33)
MCHC RBC AUTO-ENTMCNC: 33.5 G/DL (ref 33.6–35)
MCV RBC AUTO: 94.6 FL (ref 81.4–97.8)
MONOCYTES # BLD AUTO: 0.44 K/UL (ref 0–0.85)
MONOCYTES NFR BLD AUTO: 7.3 % (ref 0–13.4)
NEUTROPHILS # BLD AUTO: 3.35 K/UL (ref 2–7.15)
NEUTROPHILS NFR BLD: 55.7 % (ref 44–72)
NRBC # BLD AUTO: 0 K/UL
NRBC BLD-RTO: 0 /100 WBC
PLATELET # BLD AUTO: 214 K/UL (ref 164–446)
PMV BLD AUTO: 11.3 FL (ref 9–12.9)
POTASSIUM SERPL-SCNC: 4.4 MMOL/L (ref 3.6–5.5)
PROT SERPL-MCNC: 6.7 G/DL (ref 6–8.2)
RBC # BLD AUTO: 4.8 M/UL (ref 4.2–5.4)
SODIUM SERPL-SCNC: 141 MMOL/L (ref 135–145)
TSH SERPL DL<=0.005 MIU/L-ACNC: 0.87 UIU/ML (ref 0.38–5.33)
WBC # BLD AUTO: 6 K/UL (ref 4.8–10.8)

## 2020-10-31 PROCEDURE — 83036 HEMOGLOBIN GLYCOSYLATED A1C: CPT

## 2020-10-31 PROCEDURE — 85025 COMPLETE CBC W/AUTO DIFF WBC: CPT

## 2020-10-31 PROCEDURE — 84443 ASSAY THYROID STIM HORMONE: CPT

## 2020-10-31 PROCEDURE — 82306 VITAMIN D 25 HYDROXY: CPT

## 2020-10-31 PROCEDURE — 36415 COLL VENOUS BLD VENIPUNCTURE: CPT

## 2020-10-31 PROCEDURE — 80053 COMPREHEN METABOLIC PANEL: CPT

## 2023-07-03 ENCOUNTER — OFFICE VISIT (OUTPATIENT)
Dept: URGENT CARE | Facility: CLINIC | Age: 58
End: 2023-07-03
Payer: COMMERCIAL

## 2023-07-03 VITALS
SYSTOLIC BLOOD PRESSURE: 94 MMHG | OXYGEN SATURATION: 98 % | HEIGHT: 66 IN | HEART RATE: 67 BPM | BODY MASS INDEX: 25.44 KG/M2 | TEMPERATURE: 98.2 F | WEIGHT: 158.3 LBS | DIASTOLIC BLOOD PRESSURE: 50 MMHG | RESPIRATION RATE: 10 BRPM

## 2023-07-03 DIAGNOSIS — H66.001 NON-RECURRENT ACUTE SUPPURATIVE OTITIS MEDIA OF RIGHT EAR WITHOUT SPONTANEOUS RUPTURE OF TYMPANIC MEMBRANE: ICD-10-CM

## 2023-07-03 PROCEDURE — 3078F DIAST BP <80 MM HG: CPT | Performed by: PHYSICIAN ASSISTANT

## 2023-07-03 PROCEDURE — 3074F SYST BP LT 130 MM HG: CPT | Performed by: PHYSICIAN ASSISTANT

## 2023-07-03 PROCEDURE — 99213 OFFICE O/P EST LOW 20 MIN: CPT | Performed by: PHYSICIAN ASSISTANT

## 2023-07-03 RX ORDER — UBROGEPANT 100 MG/1
TABLET ORAL
COMMUNITY
Start: 2023-04-16

## 2023-07-03 RX ORDER — AMOXICILLIN AND CLAVULANATE POTASSIUM 875; 125 MG/1; MG/1
1 TABLET, FILM COATED ORAL 2 TIMES DAILY
Qty: 14 TABLET | Refills: 0 | Status: SHIPPED | OUTPATIENT
Start: 2023-07-03 | End: 2023-07-10

## 2023-07-03 RX ORDER — CLONAZEPAM 1 MG/1
TABLET ORAL
COMMUNITY
Start: 2023-06-05

## 2023-07-03 RX ORDER — DESVENLAFAXINE SUCCINATE 50 MG/1
50 TABLET, EXTENDED RELEASE ORAL
COMMUNITY
Start: 2023-06-27

## 2023-07-03 ASSESSMENT — ENCOUNTER SYMPTOMS
SHORTNESS OF BREATH: 0
FEVER: 0
DIARRHEA: 0
HEADACHES: 0
MYALGIAS: 0
NAUSEA: 0
ABDOMINAL PAIN: 0
SORE THROAT: 1
VOMITING: 0
CONSTIPATION: 0
CHILLS: 0
COUGH: 0
EYE PAIN: 0

## 2023-07-04 NOTE — PROGRESS NOTES
"Subjective:   Keiko Burgos is a 58 y.o. female who presents for Ear Fullness (X 1 week, trouble swallowing, worried about ear infection and strep)      58-year-old female history of chronic eustachian tube dysfunction, status post tympanostomy tubes notes week long history of fairly severe allergies with postnasal drip and sore throat however she has had a more significant bilateral ear pain over the last day or 2.  No fevers.  Feels like previous ear infections.    Review of Systems   Constitutional:  Negative for chills and fever.   HENT:  Positive for congestion, ear pain and sore throat.    Eyes:  Negative for pain.   Respiratory:  Negative for cough and shortness of breath.    Cardiovascular:  Negative for chest pain.   Gastrointestinal:  Negative for abdominal pain, constipation, diarrhea, nausea and vomiting.   Genitourinary:  Negative for dysuria.   Musculoskeletal:  Negative for myalgias.   Skin:  Negative for rash.   Neurological:  Negative for headaches.       Medications, Allergies, and current problem list reviewed today in Epic.     Objective:     BP 94/50   Pulse 67   Temp 36.8 °C (98.2 °F) (Temporal)   Resp (!) 10   Ht 1.687 m (5' 6.42\")   Wt 71.8 kg (158 lb 4.8 oz)   SpO2 98%     Physical Exam  Vitals reviewed.   Constitutional:       Appearance: Normal appearance.   HENT:      Head: Normocephalic and atraumatic.      Right Ear: Ear canal and external ear normal.      Left Ear: Ear canal and external ear normal.      Ears:      Comments: Right side erythematous and bulging, left side mildly erythematous, trace cerumen left canal     Nose: Rhinorrhea present.      Mouth/Throat:      Mouth: Mucous membranes are moist.      Pharynx: Oropharynx is clear. No oropharyngeal exudate or posterior oropharyngeal erythema.      Comments: POST NASAL DRIP  Eyes:      Conjunctiva/sclera: Conjunctivae normal.   Cardiovascular:      Rate and Rhythm: Normal rate.   Pulmonary:      Effort: Pulmonary effort " is normal.   Musculoskeletal:      Cervical back: Normal range of motion.   Skin:     General: Skin is warm and dry.      Capillary Refill: Capillary refill takes less than 2 seconds.   Neurological:      Mental Status: She is alert and oriented to person, place, and time.         Assessment/Plan:     Diagnosis and associated orders:     1. Non-recurrent acute suppurative otitis media of right ear without spontaneous rupture of tympanic membrane  amoxicillin-clavulanate (AUGMENTIN) 875-125 MG Tab         Comments/MDM:     If ongoing symptoms consider follow-up with ENT given her history.  Suspect allergic pharyngitis or referred pain from the ear.  She is fairly optimized on her allergy medications.  Follow-up as needed.         Differential diagnosis, natural history, supportive care, and indications for immediate follow-up discussed.    Advised the patient to follow-up with the primary care physician for recheck, reevaluation, and consideration of further management.    Please note that this dictation was created using voice recognition software. I have made a reasonable attempt to correct obvious errors, but I expect that there are errors of grammar and possibly content that I did not discover before finalizing the note.    This note was electronically signed by Sundar Keane PA-C

## 2023-07-16 ENCOUNTER — OFFICE VISIT (OUTPATIENT)
Dept: URGENT CARE | Facility: CLINIC | Age: 58
End: 2023-07-16
Payer: COMMERCIAL

## 2023-07-16 VITALS
TEMPERATURE: 97.5 F | RESPIRATION RATE: 8 BRPM | HEART RATE: 72 BPM | HEIGHT: 66 IN | BODY MASS INDEX: 25.33 KG/M2 | WEIGHT: 157.6 LBS | OXYGEN SATURATION: 97 % | DIASTOLIC BLOOD PRESSURE: 44 MMHG | SYSTOLIC BLOOD PRESSURE: 64 MMHG

## 2023-07-16 DIAGNOSIS — H65.91 FLUID LEVEL BEHIND TYMPANIC MEMBRANE OF RIGHT EAR: ICD-10-CM

## 2023-07-16 PROCEDURE — 3074F SYST BP LT 130 MM HG: CPT | Performed by: FAMILY MEDICINE

## 2023-07-16 PROCEDURE — 99213 OFFICE O/P EST LOW 20 MIN: CPT | Performed by: FAMILY MEDICINE

## 2023-07-16 PROCEDURE — 3078F DIAST BP <80 MM HG: CPT | Performed by: FAMILY MEDICINE

## 2023-07-16 RX ORDER — PREDNISONE 20 MG/1
40 TABLET ORAL DAILY
Qty: 10 TABLET | Refills: 0 | Status: SHIPPED | OUTPATIENT
Start: 2023-07-16 | End: 2023-07-21

## 2023-07-16 ASSESSMENT — ENCOUNTER SYMPTOMS
FEVER: 0
SORE THROAT: 1

## 2023-07-17 NOTE — PROGRESS NOTES
Subjective:     Keiko Burgos is a 58 y.o. female who presents for Ear Pain (Since 7-3-23, got a little better but then started worsening again. Bilateral. Swallowing is also painful.)    HPI  Pt presents for evaluation of an acute problem  Patient with ear pain for the past 2 weeks  Pain is bilateral  Was evaluated in urgent care 2 weeks ago and diagnosed with right-sided otitis media and treated with Augmentin  Thought it was improving, however then worsened again  Has pain bilaterally  No ear discharge  Starting develop some radiation of pain to the back of the throat  Dr. Nunn is her ENT    Review of Systems   Constitutional:  Negative for fever.   HENT:  Positive for ear pain and sore throat.        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  MEDS:   Current Outpatient Medications:     predniSONE (DELTASONE) 20 MG Tab, Take 2 Tablets by mouth every day for 5 days., Disp: 10 Tablet, Rfl: 0    clonazePAM (KLONOPIN) 1 MG Tab, TAKE 1-2 TABLETS BY MOUTH 1-2 TIMES A DAY, Disp: , Rfl:     desvenlafaxine succinate (PRISTIQ) 50 MG TABLET SR 24 HR, Take 50 mg by mouth every day., Disp: , Rfl:     UBRELVY 100 MG Tab, TAKE 1-2 TABLETS BY MOUTH EVERY DAY AS NEEDED, Disp: , Rfl:     VITAMIN D PO, Take  by mouth., Disp: , Rfl:     Galcanezumab-gnlm (EMGALITY SC), Inject  under the skin., Disp: , Rfl:     MAGNESIUM GLUCONATE PO, Take  by mouth., Disp: , Rfl:     citalopram (CELEXA) 40 MG Tab, Take 40 mg by mouth every day., Disp: , Rfl:     gabapentin (NEURONTIN) 400 MG Cap, Take 400 mg by mouth every day., Disp: , Rfl:     B Complex-C (B-COMPLEX WITH VITAMIN C) tablet, Take 1 Tab by mouth every evening., Disp: , Rfl:     Coenzyme Q10 (CO Q 10 PO), Take 1 Cap by mouth every evening., Disp: , Rfl:     nabumetone (RELAFEN) 500 MG Tab, Take 500 mg by  "mouth 2 times a day as needed for Moderate Pain., Disp: , Rfl:   ALLERGIES:   Allergies   Allergen Reactions    Influenza Vaccines Unspecified     Hx of Guillian Victor    Keflex Unspecified     Hx of Guillian Victor    Other Drug Unspecified     All Vaccines! (including pneumonia vaccine. Hx of Guillian Victor    Rocephin [Ceftriaxone Sodium-Lidocaine] Unspecified     PT WILL NOT TAKE MEDICATION. NOT ALLERGIC.    Sulfa Drugs Unspecified     Hx of Guillian Victor    Tape Unspecified     Blister. No tape ok but coban    Tetanus Toxoids Unspecified     Hx of Guillian Victor     SURGHX:   Past Surgical History:   Procedure Laterality Date    LUMBAR DECOMPRESSION  2004    ABDOMINAL HYSTERECTOMY TOTAL      fibroid, supracervical    OOPHORECTOMY      rt side only removed     SOCHX:  reports that she quit smoking about 33 years ago. Her smoking use included cigarettes. She has never used smokeless tobacco. She reports current alcohol use. She reports current drug use. Drug: Marijuana.     Objective:   BP (!) 64/44   Pulse 72   Temp 36.4 °C (97.5 °F) (Temporal)   Resp (!) 8   Ht 1.667 m (5' 5.63\")   Wt 71.5 kg (157 lb 9.6 oz)   SpO2 97%   BMI 25.72 kg/m²     Physical Exam  Constitutional:       General: She is not in acute distress.     Appearance: She is well-developed. She is not diaphoretic.   HENT:      Head: Normocephalic and atraumatic.      Right Ear: Ear canal and external ear normal.      Left Ear: Tympanic membrane, ear canal and external ear normal.      Ears:      Comments: Right tympanic membrane with moderate clear effusion, no erythema, no purulence     Nose: Nose normal.      Mouth/Throat:      Mouth: Mucous membranes are moist.      Pharynx: Oropharynx is clear. No oropharyngeal exudate or posterior oropharyngeal erythema.   Pulmonary:      Effort: Pulmonary effort is normal.   Musculoskeletal:      Cervical back: Normal range of motion and neck supple. No tenderness.   Lymphadenopathy:      Cervical: No " cervical adenopathy.   Neurological:      Mental Status: She is alert.         Assessment/Plan:   Assessment    1. Fluid level behind tympanic membrane of right ear  - predniSONE (DELTASONE) 20 MG Tab; Take 2 Tablets by mouth every day for 5 days.  Dispense: 10 Tablet; Refill: 0    Patient with middle ear effusion.  Previously had otitis media and was treated with Augmentin.  Infection appears to have resolved, however has persistent clear effusion.  Recommended course of prednisone and follow-up with her ENT.

## 2024-12-27 ENCOUNTER — OFFICE VISIT (OUTPATIENT)
Dept: URGENT CARE | Facility: CLINIC | Age: 59
End: 2024-12-27
Payer: COMMERCIAL

## 2024-12-27 VITALS
OXYGEN SATURATION: 96 % | HEIGHT: 66 IN | SYSTOLIC BLOOD PRESSURE: 112 MMHG | WEIGHT: 170 LBS | DIASTOLIC BLOOD PRESSURE: 62 MMHG | BODY MASS INDEX: 27.32 KG/M2 | HEART RATE: 69 BPM | TEMPERATURE: 97.4 F | RESPIRATION RATE: 13 BRPM

## 2024-12-27 DIAGNOSIS — B33.8 RSV (RESPIRATORY SYNCYTIAL VIRUS INFECTION): ICD-10-CM

## 2024-12-27 LAB
FLUAV RNA SPEC QL NAA+PROBE: NEGATIVE
FLUBV RNA SPEC QL NAA+PROBE: NEGATIVE
RSV RNA SPEC QL NAA+PROBE: POSITIVE
S PYO DNA SPEC NAA+PROBE: NOT DETECTED
SARS-COV-2 RNA RESP QL NAA+PROBE: NEGATIVE

## 2024-12-27 PROCEDURE — 87651 STREP A DNA AMP PROBE: CPT

## 2024-12-27 PROCEDURE — 3078F DIAST BP <80 MM HG: CPT

## 2024-12-27 PROCEDURE — 0241U POCT CEPHEID COV-2, FLU A/B, RSV - PCR: CPT

## 2024-12-27 PROCEDURE — 99213 OFFICE O/P EST LOW 20 MIN: CPT

## 2024-12-27 PROCEDURE — 3074F SYST BP LT 130 MM HG: CPT

## 2024-12-28 NOTE — PROGRESS NOTES
"Chief Complaint   Patient presents with    Otalgia     cough x2 days  Pt has tubes in ears         Subjective:   HISTORY OF PRESENT ILLNESS: Keiko Burgos is a 59 y.o. female who presents for bilateral ear pain, sore throat and cough x 2 days.  She has not had any fevers, no SOB.  She has tubes in her ears.  Her granddaughter was recently sick with viral like illness.     Medications, Allergies, current problem list, Social and Family history reviewed today in Epic.     Objective:     /62   Pulse 69   Temp 36.3 °C (97.4 °F) (Temporal)   Resp 13   Ht 1.676 m (5' 6\")   Wt 77.1 kg (170 lb)   SpO2 96%     Physical Exam  Vitals reviewed.   Constitutional:       Appearance: Normal appearance.   HENT:      Right Ear: There is impacted cerumen.      Left Ear: There is impacted cerumen.      Ears:      Comments: Impacted cerumen was moved by lighted currette to reveal clear TM's, I can see that the right tube is still in place    Unable to visualize the complete left TM and unable to see if the tube is in place due to cerumen but the TM is not erythematous or bulging.      Mouth/Throat:      Mouth: Mucous membranes are moist.   Cardiovascular:      Rate and Rhythm: Normal rate.   Pulmonary:      Effort: Pulmonary effort is normal.   Skin:     General: Skin is warm and dry.   Neurological:      Mental Status: She is alert and oriented to person, place, and time.   Psychiatric:         Mood and Affect: Mood normal.          Assessment/Plan:     Diagnosis and associated orders    I personally reviewed prior external notes and test results pertinent to today's visit.     1. RSV (respiratory syncytial virus infection)  POCT CEPHEID GROUP A STREP - PCR    POCT CoV-2, Flu A/B, RSV by PCR        Results for orders placed or performed in visit on 12/27/24   POCT CEPHEID GROUP A STREP - PCR    Collection Time: 12/27/24  7:17 PM   Result Value Ref Range    POC Group A Strep, PCR Not Detected Not Detected, Invalid   POCT " CoV-2, Flu A/B, RSV by PCR    Collection Time: 12/27/24  7:17 PM   Result Value Ref Range    SARS-CoV-2 by PCR Negative Negative, Invalid    Influenza virus A RNA Negative Negative, Invalid    Influenza virus B, PCR Negative Negative, Invalid    RSV, PCR Positive (A) Negative, Invalid          IMPRESSION: The patient is well appearing here with reassuring exam and vitals signs. Symptomatology is consistent with a viral illness and are self limiting.  Informed that no antibiotics are indicated at this time.  Recommended supportive therapies and preferred OTC medications for symptomatic relief   I was unable to remove the cerumen with the lighted curette but was able to move it out of the way.  Her TM's are normal and the right tube is in place.  Discussed anticipated duration of illness, return to work/school, and indications to RTC or go to the Emergency department.    Patient states understanding of the plan of care and discharge instructions.  They are discharged in stable condition.     Procedure: Cerumen Removal  Risks and benefits of procedure discussed  Cerumen moved off the side wall of the canal with a lighted curette with myself  Patient tolerated well  Post procedure exam with clear canal and normal TM         Please note that this dictation was created using voice recognition software. I have made a reasonable attempt to correct obvious errors, but I expect that there are errors of grammar and possibly content that I did not discover before finalizing the note.    This note was electronically signed by AKIRA Crowder